# Patient Record
Sex: MALE | Race: WHITE | NOT HISPANIC OR LATINO | Employment: OTHER | URBAN - NONMETROPOLITAN AREA
[De-identification: names, ages, dates, MRNs, and addresses within clinical notes are randomized per-mention and may not be internally consistent; named-entity substitution may affect disease eponyms.]

---

## 2024-10-23 ENCOUNTER — OFFICE VISIT (OUTPATIENT)
Dept: FAMILY MEDICINE CLINIC | Facility: CLINIC | Age: 44
End: 2024-10-23
Payer: MEDICAID

## 2024-10-23 VITALS
HEIGHT: 75 IN | HEART RATE: 77 BPM | DIASTOLIC BLOOD PRESSURE: 90 MMHG | OXYGEN SATURATION: 96 % | WEIGHT: 279 LBS | TEMPERATURE: 97.7 F | SYSTOLIC BLOOD PRESSURE: 140 MMHG | BODY MASS INDEX: 34.69 KG/M2

## 2024-10-23 DIAGNOSIS — Z12.11 SCREENING FOR COLON CANCER: ICD-10-CM

## 2024-10-23 DIAGNOSIS — E29.1 HYPOGONADISM IN MALE: Primary | ICD-10-CM

## 2024-10-23 DIAGNOSIS — Z00.00 ROUTINE GENERAL MEDICAL EXAMINATION AT A HEALTH CARE FACILITY: Primary | ICD-10-CM

## 2024-10-23 DIAGNOSIS — Z11.59 NEED FOR HEPATITIS C SCREENING TEST: ICD-10-CM

## 2024-10-23 DIAGNOSIS — F19.11 HISTORY OF SUBSTANCE ABUSE: ICD-10-CM

## 2024-10-23 DIAGNOSIS — E11.43 TYPE 2 DIABETES MELLITUS WITH DIABETIC AUTONOMIC NEUROPATHY, WITHOUT LONG-TERM CURRENT USE OF INSULIN: ICD-10-CM

## 2024-10-23 DIAGNOSIS — E78.5 DYSLIPIDEMIA: ICD-10-CM

## 2024-10-23 DIAGNOSIS — E29.1 HYPOGONADISM IN MALE: ICD-10-CM

## 2024-10-23 DIAGNOSIS — Z80.0 FAMILY HISTORY OF COLON CANCER IN FATHER: ICD-10-CM

## 2024-10-23 DIAGNOSIS — Z12.5 SPECIAL SCREENING FOR MALIGNANT NEOPLASM OF PROSTATE: ICD-10-CM

## 2024-10-23 DIAGNOSIS — Z79.890 LONG-TERM CURRENT USE OF TESTOSTERONE REPLACEMENT THERAPY: ICD-10-CM

## 2024-10-23 DIAGNOSIS — R74.8 ELEVATED LIVER ENZYMES: ICD-10-CM

## 2024-10-23 DIAGNOSIS — I10 PRIMARY HYPERTENSION: ICD-10-CM

## 2024-10-23 PROCEDURE — 3051F HG A1C>EQUAL 7.0%<8.0%: CPT | Performed by: NURSE PRACTITIONER

## 2024-10-23 PROCEDURE — 2014F MENTAL STATUS ASSESS: CPT | Performed by: NURSE PRACTITIONER

## 2024-10-23 PROCEDURE — 99386 PREV VISIT NEW AGE 40-64: CPT | Performed by: NURSE PRACTITIONER

## 2024-10-23 RX ORDER — FENOFIBRATE 54 MG/1
54 TABLET ORAL DAILY
Qty: 90 TABLET | Refills: 1 | Status: SHIPPED | OUTPATIENT
Start: 2024-10-23

## 2024-10-23 RX ORDER — AMLODIPINE BESYLATE 10 MG/1
10 TABLET ORAL DAILY
COMMUNITY
Start: 2024-09-11 | End: 2024-10-23 | Stop reason: SDUPTHER

## 2024-10-23 RX ORDER — SEMAGLUTIDE 2.68 MG/ML
2 INJECTION, SOLUTION SUBCUTANEOUS WEEKLY
COMMUNITY
Start: 2024-09-11 | End: 2024-10-23 | Stop reason: SDUPTHER

## 2024-10-23 RX ORDER — TESTOSTERONE CYPIONATE 200 MG/ML
200 INJECTION, SOLUTION INTRAMUSCULAR
COMMUNITY
Start: 2024-09-03 | End: 2024-10-23 | Stop reason: SDUPTHER

## 2024-10-23 RX ORDER — FENOFIBRATE 54 MG/1
54 TABLET ORAL DAILY
COMMUNITY
Start: 2024-09-03 | End: 2024-10-23 | Stop reason: SDUPTHER

## 2024-10-23 RX ORDER — IBUPROFEN 800 MG/1
800 TABLET, FILM COATED ORAL EVERY 6 HOURS PRN
COMMUNITY

## 2024-10-23 RX ORDER — METFORMIN HYDROCHLORIDE 500 MG/1
500 TABLET, EXTENDED RELEASE ORAL
COMMUNITY
Start: 2024-09-11 | End: 2024-10-23

## 2024-10-23 RX ORDER — LOSARTAN POTASSIUM 25 MG/1
25 TABLET ORAL DAILY
Qty: 30 TABLET | Refills: 2 | Status: SHIPPED | OUTPATIENT
Start: 2024-10-23

## 2024-10-23 RX ORDER — AMLODIPINE BESYLATE 10 MG/1
10 TABLET ORAL DAILY
Qty: 90 TABLET | Refills: 1 | Status: SHIPPED | OUTPATIENT
Start: 2024-10-23

## 2024-10-23 NOTE — PROGRESS NOTES
New Patient History and Physical      Referring Physician: No ref. provider found    Chief Complaint:    Chief Complaint   Patient presents with    [facility]    Med Refill     Patient states he is a diabetic and is out of his ozempic     Annual Exam       History of Present Illness:   Roque Proctor is a 44 y.o. male who presents today as a new patient. Annual physical exam.    Recently moved back 3 months ago.     Type 2 DM  Ozempic 2mg weekly--has not taken in 2 weeks, needs refill  Restarted Metformin XR 500mg for the past 2 weeks  Glucose has been running in 200-250 range since he stopped ozempic  Prior to that was between 100-150 consistently    HTN  Amlodipine 10mg daily    HLD  Fenofibrate 54mg daily    Hypogonadism  Testosterone 200mg every 2 weeks  Has not taken in 2 weeks    Vapes daily, nicotine.     Denies family history of prostate cancer  Family history of colon cancer--father    Denies chest pain, SOA, palpitations, severe HA, confusion, visual disturbance, dizziness.   Denies SI/HI. Denies any aspiration/dysphagia. Balanced dietary intake, good hydration habits. Denies fever, chills, night sweats. Reports adequate UOP without hematuria. Denies BRB/BTS.    Subjective     Review of Systems   Constitutional:  Negative for unexpected weight change.   Eyes:  Negative for visual disturbance.   Respiratory:  Negative for chest tightness and shortness of breath.    Cardiovascular:  Negative for chest pain and palpitations.   Gastrointestinal:  Negative for abdominal pain, blood in stool, constipation and diarrhea.   Endocrine: Negative for polydipsia, polyphagia and polyuria.   Genitourinary:  Negative for penile discharge, penile pain, penile swelling, scrotal swelling and testicular pain.   Neurological:  Negative for dizziness and light-headedness.   Psychiatric/Behavioral:  Negative for self-injury and suicidal ideas.    All other systems reviewed and are negative.         The following  portions of the patient's history were reviewed and updated as appropriate: allergies, current medications, past family history, past medical history, past social history, past surgical history and problem list.    Past Medical History:   Past Medical History:   Diagnosis Date    Anxiety     Arthritis     Diabetes mellitus     Hypertension     Substance abuse     Visual impairment        Past Surgical History:History reviewed. No pertinent surgical history.    Family History: family history includes Colon cancer in his father; Diabetes in his father, maternal grandfather, maternal grandmother, paternal grandfather, and paternal grandmother; Lung cancer in his mother.    Social History:  reports that he quit smoking about 4 years ago. His smoking use included cigarettes. He started smoking about 31 years ago. He has a 27 pack-year smoking history. He has never been exposed to tobacco smoke. He has never used smokeless tobacco. He reports that he does not currently use alcohol. He reports that he does not currently use drugs after having used the following drugs: Heroin and Methamphetamines.    Medications:    Current Outpatient Medications:     amLODIPine (NORVASC) 10 MG tablet, Take 1 tablet by mouth Daily., Disp: 90 tablet, Rfl: 1    fenofibrate (TRICOR) 54 MG tablet, Take 1 tablet by mouth Daily., Disp: 90 tablet, Rfl: 1    ibuprofen (ADVIL,MOTRIN) 800 MG tablet, Take 1 tablet by mouth Every 6 (Six) Hours As Needed., Disp: , Rfl:     losartan (Cozaar) 25 MG tablet, Take 1 tablet by mouth Daily., Disp: 30 tablet, Rfl: 2    Ozempic, 2 MG/DOSE, 8 MG/3ML solution pen-injector, Inject 2 mg under the skin into the appropriate area as directed 1 (One) Time Per Week., Disp: 3 mL, Rfl: 2    Testosterone Cypionate (DEPOTESTOTERONE CYPIONATE) 200 MG/ML injection, Inject 1 mL into the appropriate muscle as directed by prescriber Every 14 (Fourteen) Days., Disp: 2 mL, Rfl: 0    Allergies:  No Known Allergies    Objective  "    Vital Signs:   /90   Pulse 77   Temp 97.7 °F (36.5 °C)   Ht 190.5 cm (75\") Comment: Patient states height  Wt 127 kg (279 lb)   SpO2 96%   BMI 34.87 kg/m²      BMI is >= 30 and <35. (Class 1 Obesity). The following options were offered after discussion;: information on healthy weight added to patient's after visit summary           Physical Exam:  Physical Exam  Vitals and nursing note reviewed.   HENT:      Right Ear: Tympanic membrane normal.      Left Ear: Tympanic membrane normal.      Nose: Nose normal.      Mouth/Throat:      Lips: Pink.   Eyes:      General: Lids are normal.      Extraocular Movements: Extraocular movements intact.      Conjunctiva/sclera: Conjunctivae normal.      Pupils: Pupils are equal, round, and reactive to light.   Neck:      Thyroid: No thyroid mass, thyromegaly or thyroid tenderness.      Vascular: Normal carotid pulses. No carotid bruit or JVD.   Cardiovascular:      Rate and Rhythm: Normal rate and regular rhythm.      Heart sounds: Normal heart sounds.   Pulmonary:      Effort: Pulmonary effort is normal.      Breath sounds: Normal breath sounds.   Abdominal:      General: Bowel sounds are normal.      Palpations: Abdomen is soft.   Genitourinary:     Comments: deferred  Skin:     Capillary Refill: Capillary refill takes less than 2 seconds.   Neurological:      Mental Status: He is alert and oriented to person, place, and time.      Gait: Gait is intact.   Psychiatric:         Attention and Perception: Attention normal.         Mood and Affect: Mood and affect normal.         Speech: Speech normal.         Behavior: Behavior normal. Behavior is cooperative.         Assessment / Plan     Assessment/Plan:   Diagnoses and all orders for this visit:    1. Routine general medical examination at a health care facility (Primary)  -     Hemoglobin A1c  -     CBC w AUTO Differential  -     Comprehensive metabolic panel  -     Lipid Panel    2. Primary hypertension  -     " losartan (Cozaar) 25 MG tablet; Take 1 tablet by mouth Daily.  Dispense: 30 tablet; Refill: 2  -     amLODIPine (NORVASC) 10 MG tablet; Take 1 tablet by mouth Daily.  Dispense: 90 tablet; Refill: 1  -     Comprehensive metabolic panel    3. Type 2 diabetes mellitus with diabetic autonomic neuropathy, without long-term current use of insulin  -     Hemoglobin A1c  -     Comprehensive metabolic panel    4. Dyslipidemia  -     fenofibrate (TRICOR) 54 MG tablet; Take 1 tablet by mouth Daily.  Dispense: 90 tablet; Refill: 1  -     Lipid Panel    5. Hypogonadism in male    6. Long-term current use of testosterone replacement therapy    7. Need for hepatitis C screening test  -     Hepatitis C Antibody    8. History of substance abuse    9. Family history of colon cancer in father    10. Screening for colon cancer  -     Ambulatory Referral For Screening Colonoscopy    11. Special screening for malignant neoplasm of prostate  -     PSA SCREENING    12. Elevated liver enzymes  -     Hepatitis C Virus (HCV) RNA, Diagnosis     Risks, benefits, and potential side effects of current/new medications reviewed with patient.  Patient voiced understanding and wished to proceed with treatment.    I will contact patient regarding test results and provide instructions regarding any necessary changes in plan of care.    Encouraged ambulatory glucose monitoring. Patient to call office or RTC for glucose levels consistently greater than 300 or poorly controlled with current medication regimen.     Discussed need for reduction in sodium/salt/caffeine intake; improve sleep habits as able; inc formal CV exercise program with goal of vigorous activity most, if not all, days of the week.     Ambulatory blood pressure monitoring encouraged prior to taking medication daily and as needed.    Contact office for symptomatic HTN or consistently uncontrolled HTN.     Patient to increase dietary intake of vegetables, fruits, nuts, whole grains and  fish. Decrease dietary intake of carbs, processed foods such as lunch meats, saturated fats, sugary beverages.     Increase exercise regimen as tolerated toward a goal of 120-150 minutes/week.     Patient to present to ED for chest pain, confusion, vision disturbance.     Patient was encouraged to keep me informed of any acute changes, lack of improvement, or any new concerning symptoms.      Discussion Summary:  Discussed plan of care in detail with pt today; pt verb understanding and agrees.        I have reviewed and updated all copied forward information, as appropriate.  I attest to the accuracy and relevance of any unchanged information.        Follow up:  No follow-ups on file.     Patient Education:  There are no Patient Instructions on file for this visit.    KAYLIE Muñoz  11/07/24  12:28 EST    Please note that portions of this note may have been completed with a voice recognition program.

## 2024-10-23 NOTE — TELEPHONE ENCOUNTER
Caller: Esthela Roque ZAHEER    Relationship: Self    Best call back number: [phone]     Requested Prescriptions:   Requested Prescriptions     Pending Prescriptions Disp Refills    Testosterone Cypionate (DEPOTESTOTERONE CYPIONATE) 200 MG/ML injection       Sig: Inject 1 mL into the appropriate muscle as directed by prescriber Every 14 (Fourteen) Days.    Ozempic, 2 MG/DOSE, 8 MG/3ML solution pen-injector       Sig: Inject 2 mg under the skin into the appropriate area as directed 1 (One) Time Per Week.        Pharmacy where request should be sent: [address] [phone] [facility] [phone] [facility]     Last office visit with prescribing clinician: 10/23/2024   Last telemedicine visit with prescribing clinician: Visit date not found   Next office visit with prescribing clinician: 1/23/2025     Additional details provided by patient: THE PATIENT ADVISED THAT HE IS OUT OF MEDICATION AND THOUGHTS THESE WERE BEING SENT IN AT HIS APPOINTMENT TODAY.     Does the patient have less than a 3 day supply:  [x] Yes  [] No    Would you like a call back once the refill request has been completed: [] Yes [x] No    If the office needs to give you a call back, can they leave a voicemail: [] Yes [x] No    Stephanie Rivero Rep   10/23/24 18:04 EDT

## 2024-10-24 LAB
ALBUMIN SERPL-MCNC: 4.4 G/DL (ref 4.1–5.1)
ALP SERPL-CCNC: 82 IU/L (ref 44–121)
ALT SERPL-CCNC: 165 IU/L (ref 0–44)
AST SERPL-CCNC: 69 IU/L (ref 0–40)
BASOPHILS # BLD AUTO: 0.1 X10E3/UL (ref 0–0.2)
BASOPHILS NFR BLD AUTO: 1 %
BILIRUB SERPL-MCNC: 0.5 MG/DL (ref 0–1.2)
BUN SERPL-MCNC: 11 MG/DL (ref 6–24)
BUN/CREAT SERPL: 12 (ref 9–20)
CALCIUM SERPL-MCNC: 9.1 MG/DL (ref 8.7–10.2)
CHLORIDE SERPL-SCNC: 100 MMOL/L (ref 96–106)
CHOLEST SERPL-MCNC: 152 MG/DL (ref 100–199)
CO2 SERPL-SCNC: 21 MMOL/L (ref 20–29)
CREAT SERPL-MCNC: 0.93 MG/DL (ref 0.76–1.27)
EGFRCR SERPLBLD CKD-EPI 2021: 103 ML/MIN/1.73
EOSINOPHIL # BLD AUTO: 0.2 X10E3/UL (ref 0–0.4)
EOSINOPHIL NFR BLD AUTO: 1 %
ERYTHROCYTE [DISTWIDTH] IN BLOOD BY AUTOMATED COUNT: 13.1 % (ref 11.6–15.4)
GLOBULIN SER CALC-MCNC: 2.7 G/DL (ref 1.5–4.5)
GLUCOSE SERPL-MCNC: 226 MG/DL (ref 70–99)
HBA1C MFR BLD: 7.5 % (ref 4.8–5.6)
HCT VFR BLD AUTO: 47.1 % (ref 37.5–51)
HCV IGG SERPL QL IA: REACTIVE
HDLC SERPL-MCNC: 28 MG/DL
HGB BLD-MCNC: 15.3 G/DL (ref 13–17.7)
IMM GRANULOCYTES # BLD AUTO: 0.1 X10E3/UL (ref 0–0.1)
IMM GRANULOCYTES NFR BLD AUTO: 1 %
LDLC SERPL CALC-MCNC: 90 MG/DL (ref 0–99)
LYMPHOCYTES # BLD AUTO: 6.7 X10E3/UL (ref 0.7–3.1)
LYMPHOCYTES NFR BLD AUTO: 44 %
MCH RBC QN AUTO: 27.8 PG (ref 26.6–33)
MCHC RBC AUTO-ENTMCNC: 32.5 G/DL (ref 31.5–35.7)
MCV RBC AUTO: 86 FL (ref 79–97)
MONOCYTES # BLD AUTO: 0.8 X10E3/UL (ref 0.1–0.9)
MONOCYTES NFR BLD AUTO: 5 %
NEUTROPHILS # BLD AUTO: 7.3 X10E3/UL (ref 1.4–7)
NEUTROPHILS NFR BLD AUTO: 48 %
PLATELET # BLD AUTO: 226 X10E3/UL (ref 150–450)
POTASSIUM SERPL-SCNC: 4 MMOL/L (ref 3.5–5.2)
PROT SERPL-MCNC: 7.1 G/DL (ref 6–8.5)
PSA SERPL-MCNC: 1.3 NG/ML (ref 0–4)
RBC # BLD AUTO: 5.51 X10E6/UL (ref 4.14–5.8)
SODIUM SERPL-SCNC: 138 MMOL/L (ref 134–144)
TRIGL SERPL-MCNC: 200 MG/DL (ref 0–149)
VLDLC SERPL CALC-MCNC: 34 MG/DL (ref 5–40)
WBC # BLD AUTO: 15.1 X10E3/UL (ref 3.4–10.8)

## 2024-10-24 NOTE — TELEPHONE ENCOUNTER
Rx Refill Note  Requested Prescriptions     Pending Prescriptions Disp Refills    Testosterone Cypionate (DEPOTESTOTERONE CYPIONATE) 200 MG/ML injection       Sig: Inject 1 mL into the appropriate muscle as directed by prescriber Every 14 (Fourteen) Days.    Ozempic, 2 MG/DOSE, 8 MG/3ML solution pen-injector       Sig: Inject 2 mg under the skin into the appropriate area as directed 1 (One) Time Per Week.      Last office visit with prescribing clinician: 10/23/2024   Last telemedicine visit with prescribing clinician: Visit date not found   Next office visit with prescribing clinician: 1/23/2025     Tatum Juarez MA  10/24/24, 11:20 EDT

## 2024-10-30 ENCOUNTER — TELEPHONE (OUTPATIENT)
Dept: FAMILY MEDICINE CLINIC | Facility: CLINIC | Age: 44
End: 2024-10-30

## 2024-10-30 DIAGNOSIS — E29.1 HYPOGONADISM IN MALE: ICD-10-CM

## 2024-10-30 RX ORDER — TESTOSTERONE CYPIONATE 200 MG/ML
200 INJECTION, SOLUTION INTRAMUSCULAR
Qty: 2 ML | Refills: 0 | Status: CANCELLED | OUTPATIENT
Start: 2024-10-30

## 2024-10-30 RX ORDER — SEMAGLUTIDE 2.68 MG/ML
2 INJECTION, SOLUTION SUBCUTANEOUS WEEKLY
Qty: 3 ML | Refills: 2 | Status: SHIPPED | OUTPATIENT
Start: 2024-10-30

## 2024-10-30 RX ORDER — SEMAGLUTIDE 2.68 MG/ML
2 INJECTION, SOLUTION SUBCUTANEOUS WEEKLY
Qty: 3 ML | Refills: 2 | Status: CANCELLED | OUTPATIENT
Start: 2024-10-30

## 2024-10-30 RX ORDER — TESTOSTERONE CYPIONATE 200 MG/ML
200 INJECTION, SOLUTION INTRAMUSCULAR
Qty: 2 ML | Refills: 0 | Status: SHIPPED | OUTPATIENT
Start: 2024-10-30

## 2024-10-30 NOTE — TELEPHONE ENCOUNTER
Pt called and said he spoke with Lizzy about getting his Testosterone and ozempic refilled  and needs it because he out

## 2024-10-31 ENCOUNTER — TELEPHONE (OUTPATIENT)
Dept: SURGERY | Facility: CLINIC | Age: 44
End: 2024-10-31
Payer: MEDICAID

## 2024-10-31 ENCOUNTER — TELEPHONE (OUTPATIENT)
Dept: FAMILY MEDICINE CLINIC | Facility: CLINIC | Age: 44
End: 2024-10-31
Payer: MEDICAID

## 2024-10-31 NOTE — TELEPHONE ENCOUNTER
Tried to reach the pt regarding a colonoscopy referral from KAYLIE Muñoz-VM box is full-Will try again to reach the pt to go over the colonoscopy screening questions. 1st attempt.     *IF PT RETURNS THIS CALL, PLEASE SEND THE CALL TO THE OFFICE. ASK FOR AVELINA OR ARTURO, IF AVELINA IS NOT AVAILABLE*

## 2024-11-01 LAB
HCV RNA SERPL NAA+PROBE-ACNC: NORMAL IU/ML
HCV RNA SERPL NAA+PROBE-LOG IU: 5.18 LOG10 IU/ML
TEST INFORMATION: NORMAL

## 2024-11-05 ENCOUNTER — TELEPHONE (OUTPATIENT)
Dept: SURGERY | Facility: CLINIC | Age: 44
End: 2024-11-05
Payer: MEDICAID

## 2024-11-06 ENCOUNTER — OFFICE VISIT (OUTPATIENT)
Dept: FAMILY MEDICINE CLINIC | Facility: CLINIC | Age: 44
End: 2024-11-06
Payer: MEDICAID

## 2024-11-06 VITALS
RESPIRATION RATE: 16 BRPM | HEIGHT: 75 IN | HEART RATE: 76 BPM | TEMPERATURE: 97.9 F | DIASTOLIC BLOOD PRESSURE: 90 MMHG | OXYGEN SATURATION: 98 % | WEIGHT: 277 LBS | SYSTOLIC BLOOD PRESSURE: 138 MMHG | BODY MASS INDEX: 34.44 KG/M2

## 2024-11-06 DIAGNOSIS — R53.83 DECREASED ENERGY: Primary | ICD-10-CM

## 2024-11-06 DIAGNOSIS — B17.10 ACUTE HEPATITIS C VIRUS INFECTION WITHOUT HEPATIC COMA: ICD-10-CM

## 2024-11-06 DIAGNOSIS — R53.83 OTHER FATIGUE: ICD-10-CM

## 2024-11-06 PROCEDURE — 1159F MED LIST DOCD IN RCRD: CPT | Performed by: NURSE PRACTITIONER

## 2024-11-06 PROCEDURE — 1160F RVW MEDS BY RX/DR IN RCRD: CPT | Performed by: NURSE PRACTITIONER

## 2024-11-06 PROCEDURE — 99213 OFFICE O/P EST LOW 20 MIN: CPT | Performed by: NURSE PRACTITIONER

## 2024-11-06 PROCEDURE — 3051F HG A1C>EQUAL 7.0%<8.0%: CPT | Performed by: NURSE PRACTITIONER

## 2024-11-06 NOTE — PROGRESS NOTES
"                      Established Patient        Chief Complaint:   Chief Complaint   Patient presents with    Fatigue     Pt is here for being tired all the time and just wants to sleep.          History of Present Illness:    Roque Proctor is a 44 y.o. male who presents today for concerns of fatigue, decreased energy. Patient reports onset about 2 months ago. States that he thinks it may be due to going without all medication for 2 weeks and then having to restart after last appointment.     Current Hepatitis C infection. Referral to GI for treatment options.     Subjective     The following portions of the patient's history were reviewed and updated as appropriate: allergies, current medications, past family history, past medical history, past social history, past surgical history and problem list.    ALLERGIES  No Known Allergies    ROS  Review of Systems   Constitutional:  Positive for activity change and fatigue. Negative for unexpected weight change.   Eyes:  Negative for visual disturbance.   Respiratory:  Negative for chest tightness and shortness of breath.    Cardiovascular:  Negative for chest pain and palpitations.   Endocrine: Negative for polydipsia, polyphagia and polyuria.   Neurological:  Negative for dizziness and light-headedness.   Psychiatric/Behavioral:  Negative for self-injury and suicidal ideas.    All other systems reviewed and are negative.        Objective     Vital Signs:   /90   Pulse 76   Temp 97.9 °F (36.6 °C) (Axillary)   Resp 16   Ht 190.5 cm (75\")   Wt 126 kg (277 lb)   SpO2 98%   BMI 34.62 kg/m²                Physical Exam   Physical Exam  Vitals and nursing note reviewed.   HENT:      Mouth/Throat:      Lips: Pink.      Mouth: Mucous membranes are moist.   Eyes:      General: Lids are normal.      Extraocular Movements: Extraocular movements intact.      Pupils: Pupils are equal, round, and reactive to light.   Cardiovascular:      Rate and Rhythm: Normal rate and " regular rhythm.      Heart sounds: Normal heart sounds.   Pulmonary:      Effort: Pulmonary effort is normal.      Breath sounds: Normal breath sounds.   Neurological:      Mental Status: He is alert and oriented to person, place, and time.      Gait: Gait is intact.   Psychiatric:         Attention and Perception: Attention normal.         Mood and Affect: Mood and affect normal.         Speech: Speech normal.         Behavior: Behavior normal. Behavior is cooperative.         Assessment and Plan      Assessment/Plan:   Diagnoses and all orders for this visit:    1. Decreased energy (Primary)    2. Other fatigue    3. Acute hepatitis C virus infection without hepatic coma  -     Ambulatory Referral to Gastroenterology    Follow up with Gastroenterology.    Discussion Summary:  Discussed plan of care in detail with pt today; pt verb understanding and agrees.            I have reviewed and updated all copied forward information, as appropriate.  I attest to the accuracy and relevance of any unchanged information.      Follow up:  No follow-ups on file.     Patient Education:  There are no Patient Instructions on file for this visit.    KAYLIE Muñoz  11/20/24  18:15 EST          Please note that portions of this note may have been completed with a voice recognition program.

## 2024-11-08 DIAGNOSIS — E29.1 HYPOGONADISM IN MALE: ICD-10-CM

## 2024-11-08 RX ORDER — TESTOSTERONE CYPIONATE 200 MG/ML
200 INJECTION, SOLUTION INTRAMUSCULAR
Qty: 2 ML | Refills: 0 | Status: SHIPPED | OUTPATIENT
Start: 2024-11-08

## 2024-11-08 RX ORDER — SEMAGLUTIDE 2.68 MG/ML
2 INJECTION, SOLUTION SUBCUTANEOUS WEEKLY
Qty: 3 ML | Refills: 2 | Status: SHIPPED | OUTPATIENT
Start: 2024-11-08

## 2024-11-15 ENCOUNTER — TELEPHONE (OUTPATIENT)
Dept: GASTROENTEROLOGY | Facility: CLINIC | Age: 44
End: 2024-11-15

## 2024-11-15 ENCOUNTER — LAB (OUTPATIENT)
Dept: LAB | Facility: HOSPITAL | Age: 44
End: 2024-11-15
Payer: MEDICAID

## 2024-11-15 ENCOUNTER — SPECIALTY PHARMACY (OUTPATIENT)
Dept: PHARMACY | Facility: HOSPITAL | Age: 44
End: 2024-11-15
Payer: MEDICAID

## 2024-11-15 VITALS
DIASTOLIC BLOOD PRESSURE: 80 MMHG | HEART RATE: 82 BPM | OXYGEN SATURATION: 96 % | WEIGHT: 279.8 LBS | SYSTOLIC BLOOD PRESSURE: 140 MMHG | TEMPERATURE: 98.6 F | BODY MASS INDEX: 34.97 KG/M2

## 2024-11-15 DIAGNOSIS — Z12.12 ENCOUNTER FOR COLORECTAL CANCER SCREENING: Primary | ICD-10-CM

## 2024-11-15 DIAGNOSIS — B18.2 CHRONIC HEPATITIS C WITHOUT HEPATIC COMA: ICD-10-CM

## 2024-11-15 DIAGNOSIS — Z12.12 ENCOUNTER FOR COLORECTAL CANCER SCREENING: ICD-10-CM

## 2024-11-15 DIAGNOSIS — B18.2 CHRONIC HEPATITIS C WITHOUT HEPATIC COMA: Primary | ICD-10-CM

## 2024-11-15 DIAGNOSIS — R74.8 ELEVATED LIVER ENZYMES: ICD-10-CM

## 2024-11-15 DIAGNOSIS — Z12.11 ENCOUNTER FOR COLORECTAL CANCER SCREENING: Primary | ICD-10-CM

## 2024-11-15 DIAGNOSIS — Z12.11 ENCOUNTER FOR COLORECTAL CANCER SCREENING: ICD-10-CM

## 2024-11-15 LAB
HBV SURFACE AB SER RIA-ACNC: REACTIVE
HBV SURFACE AG SERPL QL IA: NORMAL
HIV 1+2 AB+HIV1 P24 AG SERPL QL IA: NORMAL
INR PPP: 1.09 (ref 0.9–1.1)
PROTHROMBIN TIME: 14.7 SECONDS (ref 12.3–15.1)

## 2024-11-15 PROCEDURE — 86704 HEP B CORE ANTIBODY TOTAL: CPT

## 2024-11-15 PROCEDURE — 36415 COLL VENOUS BLD VENIPUNCTURE: CPT

## 2024-11-15 PROCEDURE — 86706 HEP B SURFACE ANTIBODY: CPT

## 2024-11-15 PROCEDURE — 86708 HEPATITIS A ANTIBODY: CPT

## 2024-11-15 PROCEDURE — 85610 PROTHROMBIN TIME: CPT

## 2024-11-15 PROCEDURE — G0432 EIA HIV-1/HIV-2 SCREEN: HCPCS

## 2024-11-15 PROCEDURE — 87340 HEPATITIS B SURFACE AG IA: CPT

## 2024-11-15 RX ORDER — SODIUM CHLORIDE 9 MG/ML
30 INJECTION, SOLUTION INTRAVENOUS CONTINUOUS PRN
OUTPATIENT
Start: 2024-11-15 | End: 2024-11-16

## 2024-11-15 RX ORDER — LANOLIN ALCOHOL/MO/W.PET/CERES
1000 CREAM (GRAM) TOPICAL DAILY
COMMUNITY

## 2024-11-15 RX ORDER — BISACODYL 5 MG/1
TABLET, DELAYED RELEASE ORAL
Qty: 4 TABLET | Refills: 0 | Status: SHIPPED | OUTPATIENT
Start: 2024-11-15

## 2024-11-15 NOTE — TELEPHONE ENCOUNTER
This patient was seen in the [facility] clinic today.  He needs screening colonoscopy arranged.  Case request placed and GoLytely/Dulcolax prep sent.

## 2024-11-15 NOTE — LETTER
November 15, 2024     KAYLIE Muñoz  [address]  [facility] [address]    Patient: Roque Proctor   YOB: 1980   Date of Visit: 11/15/2024       Dear KAYLIE Muñoz,    Thank you for referring Roque Proctor to me for evaluation. Below is a copy of my consult note.    If you have questions, please do not hesitate to call me. I look forward to following Roque along with you.         Sincerely,        [facility]        CC: No Recipients         New Patient Consult      Date: 11/15/2024   Patient Name: Roque Proctor  MRN: [id number]  : 1980     Primary Care Provider: Lizzy Reyes APRN  Referring Provider: Eric    Chief Complaint   Patient presents with   • Hepatitis C     Pt here for initial Hep C eval     History of Present Illness: Roque Proctor is a 44 y.o. male who is here today as a consultation with Gastroenterology for evaluation of Hepatitis C.    He found out about having Hepatitis C infection approx 2 weeks ago. He has not had prior treatment for hepatitis. Reports no known personal history of liver disease including other viral hepatitis. There is no known family history of liver disease or cirrhosis. He admits to previous IVDU and intranasal drug use. Has been clean now for 3 years. He does have nonprofessional tattoos. Denies having previous alcoholism. He does not currently drink alcohol. He denies  current illicit drug use including marijuana. No recent liver imaging. He has had recent labs. He has not had previous vaccinations for Hepatitis A and B. Has been incarcerated in the past. He is currently working full time.      He has not had previous colonoscopy. Father had colon cancer, late 50s. No constipation or diarrhea. No rectal bleeding. No abdominal pain.     On ozempic. No current nausea or vomiting. No significant reflux symptoms.     Subjective      Past Medical History:   Diagnosis Date   • Anxiety    • Arthritis    • Diabetes mellitus     • Hypertension    • Substance abuse    • Visual impairment      History reviewed. No pertinent surgical history.  Family History   Problem Relation Age of Onset   • Lung cancer Mother    • Diabetes Father    • Colon cancer Father    • Diabetes Maternal Grandmother    • Diabetes Maternal Grandfather    • Diabetes Paternal Grandmother    • Diabetes Paternal Grandfather      Social History     Socioeconomic History   • Marital status: Single   Tobacco Use   • Smoking status: Former     Current packs/day: 0.00     Average packs/day: 1 pack/day for 27.0 years (27.0 ttl pk-yrs)     Types: Cigarettes     Start date:      Quit date:      Years since quittin.8     Passive exposure: Never   • Smokeless tobacco: Never   Vaping Use   • Vaping status: Every Day   • Substances: Nicotine, Flavoring   • Devices: Disposable   • Passive vaping exposure: Yes   Substance and Sexual Activity   • Alcohol use: Not Currently   • Drug use: Not Currently     Types: Heroin, Methamphetamines     Comment: Quit using both in    • Sexual activity: Yes     Partners: Female     Birth control/protection: None     Current Outpatient Medications:   •  amLODIPine (NORVASC) 10 MG tablet, Take 1 tablet by mouth Daily., Disp: 90 tablet, Rfl: 1  •  fenofibrate (TRICOR) 54 MG tablet, Take 1 tablet by mouth Daily., Disp: 90 tablet, Rfl: 1  •  losartan (Cozaar) 25 MG tablet, Take 1 tablet by mouth Daily., Disp: 30 tablet, Rfl: 2  •  Ozempic, 2 MG/DOSE, 8 MG/3ML solution pen-injector, Inject 2 mg under the skin into the appropriate area as directed 1 (One) Time Per Week., Disp: 3 mL, Rfl: 2  •  Testosterone Cypionate (DEPOTESTOTERONE CYPIONATE) 200 MG/ML injection, Inject 1 mL into the appropriate muscle as directed by prescriber Every 14 (Fourteen) Days., Disp: 2 mL, Rfl: 0  •  vitamin B-12 (CYANOCOBALAMIN) 1000 MCG tablet, Take 1 tablet by mouth Daily., Disp: , Rfl:   •  vitamin D3 125 MCG (5000 UT) capsule capsule, Take 1 capsule by mouth  Daily., Disp: , Rfl:     No Known Allergies    The following portions of the patient's history were reviewed and updated as appropriate: allergies, current medications, past family history, past medical history, past social history, past surgical history and problem list.    Objective     Physical Exam  Constitutional:       General: He is not in acute distress.     Appearance: Normal appearance. He is well-developed. He is obese. He is not diaphoretic.   HENT:      Head: Normocephalic and atraumatic.      Right Ear: External ear normal.      Left Ear: External ear normal.      Nose: Nose normal.   Eyes:      General: No scleral icterus.        Right eye: No discharge.         Left eye: No discharge.      Conjunctiva/sclera: Conjunctivae normal.   Neck:      Trachea: No tracheal deviation.   Pulmonary:      Effort: Pulmonary effort is normal. No respiratory distress.   Abdominal:      General: Bowel sounds are normal. There is no distension.      Palpations: Abdomen is soft.      Tenderness: There is no abdominal tenderness. There is no guarding.      Comments: Diastasis rectus noted   Musculoskeletal:         General: Normal range of motion.      Cervical back: Normal range of motion.   Skin:     Coloration: Skin is not pale.      Findings: No erythema or rash.      Comments: tattoos   Neurological:      Mental Status: He is alert and oriented to person, place, and time.      Coordination: Coordination normal.   Psychiatric:         Mood and Affect: Mood normal.         Behavior: Behavior normal.         Thought Content: Thought content normal.         Judgment: Judgment normal.         Vitals:    11/15/24 1212   BP: 140/80   BP Location: Right arm   Patient Position: Sitting   Pulse: 82   Temp: 98.6 °F (37 °C)   SpO2: 96%   Weight: 127 kg (279 lb 12.8 oz)     Results Review:   I have reviewed the patient's new clinical and imaging results.    Office Visit on 10/23/2024   Component Date Value Ref Range Status   •  Hep C Virus Ab 10/23/2024 Reactive (A)  Non Reactive Final    Comment: HCV antibody alone does not differentiate between previously  resolved infection and active infection. Equivocal and Reactive  HCV antibody results should be followed up with an HCV RNA test  to support the diagnosis of active HCV infection.     • PSA 10/23/2024 1.3  0.0 - 4.0 ng/mL Final    Comment: Roche ECLIA methodology.  According to the American U[employer] Association, Serum PSA should  decrease and remain at undetectable levels after radical  prostatectomy. The AUA defines biochemical recurrence as an initial  PSA value 0.2 ng/mL or greater followed by a subsequent confirmatory  PSA value 0.2 ng/mL or greater.  Values obtained with different assay methods or kits cannot be used  interchangeably. Results cannot be interpreted as absolute evidence  of the presence or absence of malignant disease.     • Hemoglobin A1C 10/23/2024 7.5 (H)  4.8 - 5.6 % Final    Comment:          Prediabetes: 5.7 - 6.4           Diabetes: >6.4           Glycemic control for adults with diabetes: <7.0     • WBC 10/23/2024 15.1 (H)  3.4 - 10.8 x10E3/uL Final   • RBC 10/23/2024 5.51  4.14 - 5.80 x10E6/uL Final   • Hemoglobin 10/23/2024 15.3  13.0 - 17.7 g/dL Final   • Hematocrit 10/23/2024 47.1  37.5 - 51.0 % Final   • MCV 10/23/2024 86  79 - 97 fL Final   • MCH 10/23/2024 27.8  26.6 - 33.0 pg Final   • MCHC 10/23/2024 32.5  31.5 - 35.7 g/dL Final   • RDW 10/23/2024 13.1  11.6 - 15.4 % Final   • Platelets 10/23/2024 226  150 - 450 x10E3/uL Final   • Neutrophil Rel % 10/23/2024 48  Not Estab. % Final   • Lymphocyte Rel % 10/23/2024 44  Not Estab. % Final   • Monocyte Rel % 10/23/2024 5  Not Estab. % Final   • Eosinophil Rel % 10/23/2024 1  Not Estab. % Final   • Basophil Rel % 10/23/2024 1  Not Estab. % Final   • Neutrophils Absolute 10/23/2024 7.3 (H)  1.4 - 7.0 x10E3/uL Final   • Lymphocytes Absolute 10/23/2024 6.7 (H)  0.7 - 3.1 x10E3/uL Final   • Monocytes  Absolute 10/23/2024 0.8  0.1 - 0.9 x10E3/uL Final   • Eosinophils Absolute 10/23/2024 0.2  0.0 - 0.4 x10E3/uL Final   • Basophils Absolute 10/23/2024 0.1  0.0 - 0.2 x10E3/uL Final   • Immature Granulocyte Rel % 10/23/2024 1  Not Estab. % Final   • Immature Grans Absolute 10/23/2024 0.1  0.0 - 0.1 x10E3/uL Final   • Glucose 10/23/2024 226 (H)  70 - 99 mg/dL Final   • BUN 10/23/2024 11  6 - 24 mg/dL Final   • Creatinine 10/23/2024 0.93  0.76 - 1.27 mg/dL Final   • EGFR Result 10/23/2024 103  >59 mL/min/1.73 Final   • BUN/Creatinine Ratio 10/23/2024 12  9 - 20 Final   • Sodium 10/23/2024 138  134 - 144 mmol/L Final   • Potassium 10/23/2024 4.0  3.5 - 5.2 mmol/L Final   • Chloride 10/23/2024 100  96 - 106 mmol/L Final   • Total CO2 10/23/2024 21  20 - 29 mmol/L Final   • Calcium 10/23/2024 9.1  8.7 - 10.2 mg/dL Final   • Total Protein 10/23/2024 7.1  6.0 - 8.5 g/dL Final   • Albumin 10/23/2024 4.4  4.1 - 5.1 g/dL Final   • Globulin 10/23/2024 2.7  1.5 - 4.5 g/dL Final   • Total Bilirubin 10/23/2024 0.5  0.0 - 1.2 mg/dL Final   • Alkaline Phosphatase 10/23/2024 82  44 - 121 IU/L Final   • AST (SGOT) 10/23/2024 69 (H)  0 - 40 IU/L Final   • ALT (SGPT) 10/23/2024 165 (H)  0 - 44 IU/L Final   • Total Cholesterol 10/23/2024 152  100 - 199 mg/dL Final   • Triglycerides 10/23/2024 200 (H)  0 - 149 mg/dL Final   • HDL Cholesterol 10/23/2024 28 (L)  >39 mg/dL Final   • VLDL Cholesterol [facility] 10/23/2024 34  5 - 40 mg/dL Final   • LDL Chol Calc ([facility]) 10/23/2024 90  0 - 99 mg/dL Final   • HCV, RNA, QUANT 10/31/2024 153,000  IU/mL Final    Comment:                 HCV RNA detected  HCV RNA viral loads >/= 25 IU/mL indicate current HCV infection.     • HCV RNA, log10 10/31/2024 5.185  log10 IU/mL Final   • Test Information 10/31/2024 Comment   Final    The quantitative range of this assay is 15 IU/mL to 100 million IU/mL.      No recent abdominal imaging.      Assessment / Plan      1. Chronic hepatitis C without hepatic coma  2.  Elevated liver enzymes  He has chronic hepatitis C infection, genotype unknown, treatment naïve, without suspected cirrhosis.  He will have labs today for further evaluation of chronic hepatitis C infection as well as for consideration for treatment.    More recommendations will be made after these results have been reviewed. He will continue to abstain from alcohol and illegal drugs. Immunity to Hep A and B will be determined and vaccinations recommended if needed. After review of these results and discussion with with the patient, we will proceed with Hep C therapy and will submit Rx to insurance company for approval. Treatment will depend on fibrosis score and Hep C genotype. When approved, he will  Rx from our pharmacy and start taking exactly as directed. Hep C viral load lab (HCV RNA quant) and CMP will be checked 4 weeks after start of therapy, at end of therapy and 3 months s/p therapy to determine response to treatment and cure. He will call with concerns.     - HCV FibroSURE; Future  - Hepatitis A Antibody, Total; Future  - Hepatitis B Core Antibody, Total; Future  - Hepatitis B Surface Antibody; Future  - Hepatitis B Surface Antigen; Future  - HIV-1 / O / 2 Ag / Antibody; Future  - Protime-INR; Future    3. Encounter for colorectal cancer screening  He has not had previous colonoscopy. Father had colon cancer, late 50s.  No current GI complaints.  Will arrange screening colonoscopy at his request with the GI clinic.     He will have a colonoscopy performed with monitored anesthesia care. The indications, technique, alternatives and potential risk and complications were discussed with the patient including but not limited to bleeding, bowel perforations, missing lesions and anesthetic complications. The patient understands and wishes to proceed with the procedure and has given their verbal consent. Written patient education information was given to the patient. He should follow up in the office  after this procedure to discuss the results and further recommendations can be made at that time. The patient will call if they have further questions before procedure.  - Case Request  - Golytely and dulcolax prep sent      Follow Up:   Return in about 6 weeks (around 12/27/2024) for recheck Hepatitis C (4 weeks after starting Hep C treatment).    America Villegas PA-C  [facility] [place]  11/15/2024  15:07 EST    Dictated Utilizing Dragon Dictation: Part of this note may be an electronic transcription/translation of spoken language to printed text using the Dragon Dictation System.

## 2024-11-15 NOTE — PROGRESS NOTES
New Patient Consult      Date: 11/15/2024   Patient Name: Roque Proctor  MRN: [id number]  : 1980     Primary Care Provider: Lizzy Reyes APRN  Referring Provider: Eric    Chief Complaint   Patient presents with    Hepatitis C     Pt here for initial Hep C eval     History of Present Illness: Roque Proctor is a 44 y.o. male who is here today as a consultation with Gastroenterology for evaluation of Hepatitis C.    He found out about having Hepatitis C infection approx 2 weeks ago. He has not had prior treatment for hepatitis. Reports no known personal history of liver disease including other viral hepatitis. There is no known family history of liver disease or cirrhosis. He admits to previous IVDU and intranasal drug use. Has been clean now for 3 years. He does have nonprofessional tattoos. Denies having previous alcoholism. He does not currently drink alcohol. He denies  current illicit drug use including marijuana. No recent liver imaging. He has had recent labs. He has not had previous vaccinations for Hepatitis A and B. Has been incarcerated in the past. He is currently working full time.      He has not had previous colonoscopy. Father had colon cancer, late 50s. No constipation or diarrhea. No rectal bleeding. No abdominal pain.     On ozempic. No current nausea or vomiting. No significant reflux symptoms.     Subjective      Past Medical History:   Diagnosis Date    Anxiety     Arthritis     Diabetes mellitus     Hypertension     Substance abuse     Visual impairment      History reviewed. No pertinent surgical history.  Family History   Problem Relation Age of Onset    Lung cancer Mother     Diabetes Father     Colon cancer Father     Diabetes Maternal Grandmother     Diabetes Maternal Grandfather     Diabetes Paternal Grandmother     Diabetes Paternal Grandfather      Social History     Socioeconomic History    Marital status: Single   Tobacco Use    Smoking status: Former     Current  packs/day: 0.00     Average packs/day: 1 pack/day for 27.0 years (27.0 ttl pk-yrs)     Types: Cigarettes     Start date:      Quit date:      Years since quittin.8     Passive exposure: Never    Smokeless tobacco: Never   Vaping Use    Vaping status: Every Day    Substances: Nicotine, Flavoring    Devices: Disposable    Passive vaping exposure: Yes   Substance and Sexual Activity    Alcohol use: Not Currently    Drug use: Not Currently     Types: Heroin, Methamphetamines     Comment: Quit using both in     Sexual activity: Yes     Partners: Female     Birth control/protection: None     Current Outpatient Medications:     amLODIPine (NORVASC) 10 MG tablet, Take 1 tablet by mouth Daily., Disp: 90 tablet, Rfl: 1    fenofibrate (TRICOR) 54 MG tablet, Take 1 tablet by mouth Daily., Disp: 90 tablet, Rfl: 1    losartan (Cozaar) 25 MG tablet, Take 1 tablet by mouth Daily., Disp: 30 tablet, Rfl: 2    Ozempic, 2 MG/DOSE, 8 MG/3ML solution pen-injector, Inject 2 mg under the skin into the appropriate area as directed 1 (One) Time Per Week., Disp: 3 mL, Rfl: 2    Testosterone Cypionate (DEPOTESTOTERONE CYPIONATE) 200 MG/ML injection, Inject 1 mL into the appropriate muscle as directed by prescriber Every 14 (Fourteen) Days., Disp: 2 mL, Rfl: 0    vitamin B-12 (CYANOCOBALAMIN) 1000 MCG tablet, Take 1 tablet by mouth Daily., Disp: , Rfl:     vitamin D3 125 MCG (5000 UT) capsule capsule, Take 1 capsule by mouth Daily., Disp: , Rfl:     No Known Allergies    The following portions of the patient's history were reviewed and updated as appropriate: allergies, current medications, past family history, past medical history, past social history, past surgical history and problem list.    Objective     Physical Exam  Constitutional:       General: He is not in acute distress.     Appearance: Normal appearance. He is well-developed. He is obese. He is not diaphoretic.   HENT:      Head: Normocephalic and atraumatic.       Right Ear: External ear normal.      Left Ear: External ear normal.      Nose: Nose normal.   Eyes:      General: No scleral icterus.        Right eye: No discharge.         Left eye: No discharge.      Conjunctiva/sclera: Conjunctivae normal.   Neck:      Trachea: No tracheal deviation.   Pulmonary:      Effort: Pulmonary effort is normal. No respiratory distress.   Abdominal:      General: Bowel sounds are normal. There is no distension.      Palpations: Abdomen is soft.      Tenderness: There is no abdominal tenderness. There is no guarding.      Comments: Diastasis rectus noted   Musculoskeletal:         General: Normal range of motion.      Cervical back: Normal range of motion.   Skin:     Coloration: Skin is not pale.      Findings: No erythema or rash.      Comments: tattoos   Neurological:      Mental Status: He is alert and oriented to person, place, and time.      Coordination: Coordination normal.   Psychiatric:         Mood and Affect: Mood normal.         Behavior: Behavior normal.         Thought Content: Thought content normal.         Judgment: Judgment normal.         Vitals:    11/15/24 1212   BP: 140/80   BP Location: Right arm   Patient Position: Sitting   Pulse: 82   Temp: 98.6 °F (37 °C)   SpO2: 96%   Weight: 127 kg (279 lb 12.8 oz)     Results Review:   I have reviewed the patient's new clinical and imaging results.    Office Visit on 10/23/2024   Component Date Value Ref Range Status    Hep C Virus Ab 10/23/2024 Reactive (A)  Non Reactive Final    Comment: HCV antibody alone does not differentiate between previously  resolved infection and active infection. Equivocal and Reactive  HCV antibody results should be followed up with an HCV RNA test  to support the diagnosis of active HCV infection.      PSA 10/23/2024 1.3  0.0 - 4.0 ng/mL Final    Comment: Roche ECLIA methodology.  According to the American Urological Association, Serum PSA should  decrease and remain at undetectable levels after  radical  prostatectomy. The AUA defines biochemical recurrence as an initial  PSA value 0.2 ng/mL or greater followed by a subsequent confirmatory  PSA value 0.2 ng/mL or greater.  Values obtained with different assay methods or kits cannot be used  interchangeably. Results cannot be interpreted as absolute evidence  of the presence or absence of malignant disease.      Hemoglobin A1C 10/23/2024 7.5 (H)  4.8 - 5.6 % Final    Comment:          Prediabetes: 5.7 - 6.4           Diabetes: >6.4           Glycemic control for adults with diabetes: <7.0      WBC 10/23/2024 15.1 (H)  3.4 - 10.8 x10E3/uL Final    RBC 10/23/2024 5.51  4.14 - 5.80 x10E6/uL Final    Hemoglobin 10/23/2024 15.3  13.0 - 17.7 g/dL Final    Hematocrit 10/23/2024 47.1  37.5 - 51.0 % Final    MCV 10/23/2024 86  79 - 97 fL Final    MCH 10/23/2024 27.8  26.6 - 33.0 pg Final    MCHC 10/23/2024 32.5  31.5 - 35.7 g/dL Final    RDW 10/23/2024 13.1  11.6 - 15.4 % Final    Platelets 10/23/2024 226  150 - 450 x10E3/uL Final    Neutrophil Rel % 10/23/2024 48  Not Estab. % Final    Lymphocyte Rel % 10/23/2024 44  Not Estab. % Final    Monocyte Rel % 10/23/2024 5  Not Estab. % Final    Eosinophil Rel % 10/23/2024 1  Not Estab. % Final    Basophil Rel % 10/23/2024 1  Not Estab. % Final    Neutrophils Absolute 10/23/2024 7.3 (H)  1.4 - 7.0 x10E3/uL Final    Lymphocytes Absolute 10/23/2024 6.7 (H)  0.7 - 3.1 x10E3/uL Final    Monocytes Absolute 10/23/2024 0.8  0.1 - 0.9 x10E3/uL Final    Eosinophils Absolute 10/23/2024 0.2  0.0 - 0.4 x10E3/uL Final    Basophils Absolute 10/23/2024 0.1  0.0 - 0.2 x10E3/uL Final    Immature Granulocyte Rel % 10/23/2024 1  Not Estab. % Final    Immature Grans Absolute 10/23/2024 0.1  0.0 - 0.1 x10E3/uL Final    Glucose 10/23/2024 226 (H)  70 - 99 mg/dL Final    BUN 10/23/2024 11  6 - 24 mg/dL Final    Creatinine 10/23/2024 0.93  0.76 - 1.27 mg/dL Final    EGFR Result 10/23/2024 103  >59 mL/min/1.73 Final    BUN/Creatinine Ratio  10/23/2024 12  9 - 20 Final    Sodium 10/23/2024 138  134 - 144 mmol/L Final    Potassium 10/23/2024 4.0  3.5 - 5.2 mmol/L Final    Chloride 10/23/2024 100  96 - 106 mmol/L Final    Total CO2 10/23/2024 21  20 - 29 mmol/L Final    Calcium 10/23/2024 9.1  8.7 - 10.2 mg/dL Final    Total Protein 10/23/2024 7.1  6.0 - 8.5 g/dL Final    Albumin 10/23/2024 4.4  4.1 - 5.1 g/dL Final    Globulin 10/23/2024 2.7  1.5 - 4.5 g/dL Final    Total Bilirubin 10/23/2024 0.5  0.0 - 1.2 mg/dL Final    Alkaline Phosphatase 10/23/2024 82  44 - 121 IU/L Final    AST (SGOT) 10/23/2024 69 (H)  0 - 40 IU/L Final    ALT (SGPT) 10/23/2024 165 (H)  0 - 44 IU/L Final    Total Cholesterol 10/23/2024 152  100 - 199 mg/dL Final    Triglycerides 10/23/2024 200 (H)  0 - 149 mg/dL Final    HDL Cholesterol 10/23/2024 28 (L)  >39 mg/dL Final    VLDL Cholesterol Harris 10/23/2024 34  5 - 40 mg/dL Final    LDL Chol Calc (NIH) 10/23/2024 90  0 - 99 mg/dL Final    HCV, RNA, QUANT 10/31/2024 153,000  IU/mL Final    Comment:                 HCV RNA detected  HCV RNA viral loads >/= 25 IU/mL indicate current HCV infection.      HCV RNA, log10 10/31/2024 5.185  log10 IU/mL Final    Test Information 10/31/2024 Comment   Final    The quantitative range of this assay is 15 IU/mL to 100 million IU/mL.      No recent abdominal imaging.      Assessment / Plan      1. Chronic hepatitis C without hepatic coma  2. Elevated liver enzymes  He has chronic hepatitis C infection, genotype unknown, treatment naïve, without suspected cirrhosis.  He will have labs today for further evaluation of chronic hepatitis C infection as well as for consideration for treatment.    More recommendations will be made after these results have been reviewed. He will continue to abstain from alcohol and illegal drugs. Immunity to Hep A and B will be determined and vaccinations recommended if needed. After review of these results and discussion with with the patient, we will proceed with Hep C  therapy and will submit Rx to insurance company for approval. Treatment will depend on fibrosis score and Hep C genotype. When approved, he will  Rx from our pharmacy and start taking exactly as directed. Hep C viral load lab (HCV RNA quant) and CMP will be checked 4 weeks after start of therapy, at end of therapy and 3 months s/p therapy to determine response to treatment and cure. He will call with concerns.     - HCV FibroSURE; Future  - Hepatitis A Antibody, Total; Future  - Hepatitis B Core Antibody, Total; Future  - Hepatitis B Surface Antibody; Future  - Hepatitis B Surface Antigen; Future  - HIV-1 / O / 2 Ag / Antibody; Future  - Protime-INR; Future    3. Encounter for colorectal cancer screening  He has not had previous colonoscopy. Father had colon cancer, late 50s.  No current GI complaints.  Will arrange screening colonoscopy at his request with the GI clinic.     He will have a colonoscopy performed with monitored anesthesia care. The indications, technique, alternatives and potential risk and complications were discussed with the patient including but not limited to bleeding, bowel perforations, missing lesions and anesthetic complications. The patient understands and wishes to proceed with the procedure and has given their verbal consent. Written patient education information was given to the patient. He should follow up in the office after this procedure to discuss the results and further recommendations can be made at that time. The patient will call if they have further questions before procedure.  - Case Request  - Golytely and dulcolax prep sent      Follow Up:   Return in about 6 weeks (around 12/27/2024) for recheck Hepatitis C (4 weeks after starting Hep C treatment).    America Villegas PA-C  [facility]  11/15/2024  15:07 EST    Dictated Utilizing Dragon Dictation: Part of this note may be an electronic transcription/translation of spoken language to printed text using the  Dragon Dictation System.

## 2024-11-17 LAB
HAV AB SER QL IA: NEGATIVE
HBV CORE AB SERPL QL IA: POSITIVE

## 2024-11-18 ENCOUNTER — TELEPHONE (OUTPATIENT)
Dept: SURGERY | Facility: CLINIC | Age: 44
End: 2024-11-18

## 2024-11-18 NOTE — TELEPHONE ENCOUNTER
CALLED PT REGARDING NO SHOW ON 11/18/24.WIFE ANSWERED AND EXPLAINED THAT THE PT IS SCHEDULED FOR A COLONOSCOPY WITH DR CRUZ IN JANUARY AND WANTS HIS CARE TRANSFERRED TO [facility] BECAUSE HE HAS FAMILY HISTORY OF COLON CANCER

## 2024-11-19 ENCOUNTER — TELEPHONE (OUTPATIENT)
Dept: GASTROENTEROLOGY | Facility: CLINIC | Age: 44
End: 2024-11-19
Payer: MEDICAID

## 2024-11-19 DIAGNOSIS — B18.2 CHRONIC HEPATITIS C WITHOUT HEPATIC COMA: Primary | ICD-10-CM

## 2024-11-19 LAB
A2 MACROGLOB SERPL-MCNC: 142 MG/DL (ref 110–276)
ALT SERPL W P-5'-P-CCNC: 168 IU/L (ref 0–55)
APO A-I SERPL-MCNC: 123 MG/DL (ref 101–178)
BILIRUB SERPL-MCNC: 0.7 MG/DL (ref 0–1.2)
FIBROSIS SCORING:: ABNORMAL
FIBROSIS STAGE SERPL QL: ABNORMAL
GGT SERPL-CCNC: 37 IU/L (ref 0–65)
HAPTOGLOB SERPL-MCNC: 92 MG/DL (ref 23–355)
HCV AB SER QL: ABNORMAL
LABORATORY COMMENT REPORT: ABNORMAL
LIVER FIBR SCORE SERPL CALC.FIBROSURE: 0.23 (ref 0–0.21)
NECROINFLAMM ACTIVITY SCORING:: ABNORMAL
NECROINFLAMMATORY ACT GRADE SERPL QL: ABNORMAL
NECROINFLAMMATORY ACT SCORE SERPL: 0.74 (ref 0–0.17)
SERVICE CMNT-IMP: ABNORMAL
TEST PERFORMANCE INFO SPEC: ABNORMAL

## 2024-11-19 RX ORDER — GLECAPREVIR AND PIBRENTASVIR 40; 100 MG/1; MG/1
3 TABLET, FILM COATED ORAL DAILY
Start: 2024-11-19 | End: 2024-11-25 | Stop reason: SDUPTHER

## 2024-11-19 NOTE — TELEPHONE ENCOUNTER
He has chronic hepatitis C infection, genotype unknown, treatment naïve, without cirrhosis (F0-1). I have prescribed Mavyret 3 tabs po once daily x 8 weeks for treatment.     Immune to Hep B but not A, needs vaccination series.

## 2024-11-22 ENCOUNTER — SPECIALTY PHARMACY (OUTPATIENT)
Dept: PHARMACY | Facility: HOSPITAL | Age: 44
End: 2024-11-22
Payer: MEDICAID

## 2024-11-22 DIAGNOSIS — B18.2 CHRONIC HEPATITIS C WITHOUT HEPATIC COMA: ICD-10-CM

## 2024-11-22 RX ORDER — ATORVASTATIN CALCIUM 10 MG/1
10 TABLET, FILM COATED ORAL NIGHTLY
COMMUNITY

## 2024-11-22 NOTE — PROGRESS NOTES
[facility]  Hepatitis C Initial Assessment     Roque Proctor is a 44 y.o. male diagnosed with Hepatitis C and enrolled in the [facility] for treatment. An initial outreach was conducted with patient's spouse who helps manage his medications this included assessment of therapy appropriateness and specialty medication education for Mavyret.    Previous Hep C Treatment: Patient is treatment naïve.    Relevant Past Medical History and Comorbidities  Past Medical History:   Diagnosis Date    Anxiety     Arthritis     Diabetes mellitus     Hypertension     Substance abuse     Visual impairment      Social History     Socioeconomic History    Marital status: Single   Tobacco Use    Smoking status: Former     Current packs/day: 0.00     Average packs/day: 1 pack/day for 27.0 years (27.0 ttl pk-yrs)     Types: Cigarettes     Start date:      Quit date:      Years since quittin.8     Passive exposure: Never    Smokeless tobacco: Never   Vaping Use    Vaping status: Every Day    Substances: Nicotine, Flavoring    Devices: Disposable    Passive vaping exposure: Yes   Substance and Sexual Activity    Alcohol use: Not Currently    Drug use: Not Currently     Types: Heroin, Methamphetamines     Comment: Quit using both in     Sexual activity: Yes     Partners: Female     Birth control/protection: None       Allergies  Patient has no known allergies.    Insurance Coverage & Financial Support: [employer]    Current Medication List:    Current Outpatient Medications:     amLODIPine (NORVASC) 10 MG tablet, Take 1 tablet by mouth Daily., Disp: 90 tablet, Rfl: 1    bisacodyl (Dulcolax) 5 MG EC tablet, Follow instructions given at office, Disp: 4 tablet, Rfl: 0    fenofibrate (TRICOR) 54 MG tablet, Take 1 tablet by mouth Daily., Disp: 90 tablet, Rfl: 1    Glecaprevir-Pibrentasvir (Mavyret) 100-40 MG tablet, Take 3 tablets by mouth Daily. Take all 3 tablets PO once daily with food, Disp: , Rfl:      losartan (Cozaar) 25 MG tablet, Take 1 tablet by mouth Daily., Disp: 30 tablet, Rfl: 2    Ozempic, 2 MG/DOSE, 8 MG/3ML solution pen-injector, Inject 2 mg under the skin into the appropriate area as directed 1 (One) Time Per Week., Disp: 3 mL, Rfl: 2    polyethylene glycol (GoLYTELY) 236 g solution, Follow instructions given at office, Disp: 4000 mL, Rfl: 0    Testosterone Cypionate (DEPOTESTOTERONE CYPIONATE) 200 MG/ML injection, Inject 1 mL into the appropriate muscle as directed by prescriber Every 14 (Fourteen) Days., Disp: 2 mL, Rfl: 0    vitamin B-12 (CYANOCOBALAMIN) 1000 MCG tablet, Take 1 tablet by mouth Daily., Disp: , Rfl:     vitamin D3 125 MCG (5000 UT) capsule capsule, Take 1 capsule by mouth Daily., Disp: , Rfl:     Drug Interactions:  Medication list was reviewed for drug-drug interactions with Hepatitis C treatment. Reaction with Atorvastatin noted. Patient's authorized POA contact advised that patient needs to hold Atorvastatin during therapy with Mavyret and she voiced understanding.     Relevant Laboratory Values:  Lab Results   Component Value Date    GLUCOSE 226 (H) 10/23/2024    CALCIUM 9.1 10/23/2024     10/23/2024    K 4.0 10/23/2024    CO2 21 10/23/2024     10/23/2024    BUN 11 10/23/2024    CREATININE 0.93 10/23/2024    BCR 12 10/23/2024    AST 69 (H) 10/23/2024     (H) 10/23/2024     Lab Results   Component Value Date    WBC 15.1 (H) 10/23/2024    HGB 15.3 10/23/2024    HCT 47.1 10/23/2024    MCV 86 10/23/2024     10/23/2024       HCV RNA quant: 153,000  HCV genotype: Unknown    Fibrosis: F0-1    FIB-4: 1.08    Immunizations:  Hepatitis A: needs vaccine  Hepatitis B: immune  Lab Results   Component Value Date    HAV Negative 11/15/2024    HEPBCAB Positive (A) 11/15/2024         Co-infection Evaluation:  HIV -- Negative  Hepatitis B -- Negative      Initial Education Provided for [place]  The patient's spouse and authorized NIGHAT contact has been provided with the  following education for MAVYRET (glecaprevir and pibrentasvir). All questions and concerns have been addressed prior to the patient receiving the medication, and the patient's spouse has verbalized understanding of the education and any materials provided. Additional patient education shall be provided and documented upon request by the patient, provider or payer.      The following counseling points for Mavyret (glecaprevir and pibrentasvir) were addressed:  Goal of treatment:  This medication is used to treat hepatitis C infection with the goal of curing infection.  Directions for use:  Take 3 tablets by mouth once daily for a total of 8 weeks. Take tablets at the same time each day, with food.  Missed doses:  It is very important that you do not miss a dose of this medication. Use a pill planner, medication adherence daniela or other tool to help you remember to take your medication.  If you do miss a dose and it is within 18 hours from the usual dosage time, administer dose as soon as possible, then administer next dose at the usual dosage time. If more than 18 hours from the usual dosage time has passed, skip the missed dose and administer the next dose at the usual time.  Do not stop taking this medication without talking to your provider.  Adverse effects:  Frequently reported side effects of this medication include: headache, loss of energy, nausea and diarrhea.   Go to the ED or call 911 with signs of a significant reaction including wheezing; chest tightness; fever; itching; bad cough; blue skin color; seizures; or swelling of face, lips, tongue or throat.   Hepatic decompensation and hepatic failure have been reported. This typically occurs within the first 4 weeks of treatment initiation. Talk to your doctor right away if you notice dark urine, fatigue, lack of appetite, nausea, abdominal pain, light-colored stools, vomiting or yellow skin.  Follow-Up:  Be sure to keep your follow up appointment with our  clinic 4 weeks after starting this medication to ensure you are tolerating it well and that you are responding appropriately to therapy.   Make sure to tell your doctor and pharmacist about all medications you are taking, including herbal supplements and OTC products at each visit. Contact clinic if any new medications are started during treatment.  Storage:  Store this medication at room temperature, away from any extreme temperatures or moisture exposure.    Patient Specific Counseling Points:   If diabetic: Patients with diabetes should closely monitor their blood sugar after starting this medication as it may lead to an improvement in glucose metabolism, potentially resulting in hypoglycemia. Monitor for signs and symptoms of hypoglycemia and follow the rule of 15 to treat hypoglycemia.    Adherence and Self-Administration  Barriers to Patient Adherence and/or Self-Administration: None  Methods for Supporting Patient Adherence and/or Self-Administration: None Required     Associated Vaccinations   Your chronic liver disease puts you at risk for serious complications if you get infected with hepatitis A virus. If you've never been vaccinated against hepatitis A, you need 2 doses of this vaccine, usually spaced 6-19 months apart.     If you already have chronic hepatitis B infection, you won't need a hepatitis B vaccine.  However, if you do not have sufficient Hepatitis B antibodies (either not vaccinated or insufficient response to vaccination), you should get the Hepatitis B vaccination series.  The vaccine is given in 2 or 3 doses, depending on the brand.     A combination A & B vaccination is also available if both are needed.     Contraindications: Severe allergic reaction (eg, anaphylaxis) after a previous dose of any hepatitis A-containing or hepatitis B-containing vaccine or any component of the formulation, including yeast and neomycin.   Precautions: Consider deferring administration in patients with  moderate or severe acute illness. Use with caution in patients with bleeding disorders or severely immunocompromised patients    The patient's authorized NIGHAT contact and spouse, Briana Bartholomew, was counseled that the following immunizations are recommended: Hepatitis A    Goals of Therapy:  Patient Goals of Therapy: Adherence to therapy   Clinical Goals or Therapeutic Targets, If Applicable: SVR 12 weeks    Attestation:  I attest that the initiated specialty medication is appropriate for the patient based on my assessment.  If the prescribed therapy is at any point deemed not appropriate based on the current or future assessments, a consultation will be initiated with the patient's specialty care provider to determine the best course of action. The revised plan of therapy will be documented along with any additional patient education provided.     Assessment & Plan:  Patient has Hepatitis C, genotype unknown (F0-1)(calculated FIB-4 = 1.08) and is treatment naïve. Patient has been prescribed Mavyret 3 tablets daily with food x 8 weeks. Medication education and counseling provided as above.  Patient will obtain medication from [facility] retail pharmacy.  He will hold atorvastatin 10 mg while on Mavyret therapy.   The following immunizations are needed: Hepatitis A.   Patient will follow up in clinic 4 weeks after starting medication to assess virologic response and medication tolerability.     Ty Schultz  11/22/2024  15:18 Herminia MEJIA [facility], have reviewed the note in full and agree with the assessment and plan.  11/25/24  08:54 EST

## 2024-11-25 DIAGNOSIS — E29.1 HYPOGONADISM IN MALE: ICD-10-CM

## 2024-11-25 RX ORDER — GLECAPREVIR AND PIBRENTASVIR 40; 100 MG/1; MG/1
3 TABLET, FILM COATED ORAL DAILY
Qty: 84 TABLET | Refills: 1 | Status: SHIPPED | OUTPATIENT
Start: 2024-11-25

## 2024-11-25 NOTE — TELEPHONE ENCOUNTER
Caller: XIMENA PATEL    Relationship: Emergency Contact    Best call back number: [phone]     Requested Prescriptions:   Requested Prescriptions     Pending Prescriptions Disp Refills    Testosterone Cypionate (DEPOTESTOTERONE CYPIONATE) 200 MG/ML injection 2 mL 0     Sig: Inject 1 mL into the appropriate muscle as directed by prescriber Every 14 (Fourteen) Days.    Ozempic, 2 MG/DOSE, 8 MG/3ML solution pen-injector 3 mL 2     Sig: Inject 2 mg under the skin into the appropriate area as directed 1 (One) Time Per Week.        Pharmacy where request should be sent: [id number] - [place], [place] - [address] AT [facility] [address] [facility] - [phone]  - [phone] FX     Last office visit with prescribing clinician: 11/6/2024   Last telemedicine visit with prescribing clinician: Visit date not found   Next office visit with prescribing clinician: 1/23/2025     Additional details provided by patient: PATIENT IS COMPLETELY OUT OF ONE OF THESE MEDICATIONS      Does the patient have less than a 3 day supply:  [x] Yes  [] No    Would you like a call back once the refill request has been completed: [] Yes [x] No    If the office needs to give you a call back, can they leave a voicemail: [] Yes [x] No    Stephanie Raza Rep   11/25/24 17:04 EST

## 2024-11-26 RX ORDER — SEMAGLUTIDE 2.68 MG/ML
2 INJECTION, SOLUTION SUBCUTANEOUS WEEKLY
Qty: 3 ML | Refills: 2 | Status: SHIPPED | OUTPATIENT
Start: 2024-11-26

## 2024-11-26 RX ORDER — TESTOSTERONE CYPIONATE 200 MG/ML
200 INJECTION, SOLUTION INTRAMUSCULAR
Qty: 2 ML | Refills: 0 | Status: CANCELLED | OUTPATIENT
Start: 2024-11-26

## 2024-11-26 NOTE — TELEPHONE ENCOUNTER
Rx Refill Note  Requested Prescriptions     Pending Prescriptions Disp Refills    Testosterone Cypionate (DEPOTESTOTERONE CYPIONATE) 200 MG/ML injection 2 mL 0     Sig: Inject 1 mL into the appropriate muscle as directed by prescriber Every 14 (Fourteen) Days.     Signed Prescriptions Disp Refills    Ozempic, 2 MG/DOSE, 8 MG/3ML solution pen-injector 3 mL 2     Sig: Inject 2 mg under the skin into the appropriate area as directed 1 (One) Time Per Week.     Authorizing Provider: MAGALI LAWRENCE     Ordering User: DIANELYS GREENWOOD      Last office visit with prescribing clinician: 11/6/2024   Last telemedicine visit with prescribing clinician: Visit date not found   Next office visit with prescribing clinician: 1/23/2025                         Would you like a call back once the refill request has been completed: [] Yes [] No    If the office needs to give you a call back, can they leave a voicemail: [] Yes [] No    Dianelys Greenwood MA  11/26/24, 08:01 EST

## 2024-12-20 ENCOUNTER — LAB (OUTPATIENT)
Dept: LAB | Facility: HOSPITAL | Age: 44
End: 2024-12-20
Payer: MEDICAID

## 2024-12-20 ENCOUNTER — SPECIALTY PHARMACY (OUTPATIENT)
Dept: PHARMACY | Facility: HOSPITAL | Age: 44
End: 2024-12-20
Payer: MEDICAID

## 2024-12-20 VITALS
SYSTOLIC BLOOD PRESSURE: 150 MMHG | WEIGHT: 280 LBS | HEART RATE: 87 BPM | DIASTOLIC BLOOD PRESSURE: 95 MMHG | OXYGEN SATURATION: 99 % | BODY MASS INDEX: 35 KG/M2

## 2024-12-20 DIAGNOSIS — R74.8 ELEVATED LIVER ENZYMES: ICD-10-CM

## 2024-12-20 DIAGNOSIS — B18.2 CHRONIC HEPATITIS C WITHOUT HEPATIC COMA: ICD-10-CM

## 2024-12-20 DIAGNOSIS — B18.2 CHRONIC HEPATITIS C WITHOUT HEPATIC COMA: Primary | ICD-10-CM

## 2024-12-20 LAB
ALBUMIN SERPL-MCNC: 4.1 G/DL (ref 3.5–5.2)
ALBUMIN/GLOB SERPL: 1.1 G/DL
ALP SERPL-CCNC: 96 U/L (ref 39–117)
ALT SERPL W P-5'-P-CCNC: 20 U/L (ref 1–41)
ANION GAP SERPL CALCULATED.3IONS-SCNC: 11 MMOL/L (ref 5–15)
AST SERPL-CCNC: 18 U/L (ref 1–40)
BILIRUB SERPL-MCNC: 0.6 MG/DL (ref 0–1.2)
BUN SERPL-MCNC: 10 MG/DL (ref 6–20)
BUN/CREAT SERPL: 10.8 (ref 7–25)
CALCIUM SPEC-SCNC: 9.3 MG/DL (ref 8.6–10.5)
CHLORIDE SERPL-SCNC: 103 MMOL/L (ref 98–107)
CO2 SERPL-SCNC: 26 MMOL/L (ref 22–29)
CREAT SERPL-MCNC: 0.93 MG/DL (ref 0.76–1.27)
EGFRCR SERPLBLD CKD-EPI 2021: 102.6 ML/MIN/1.73
GLOBULIN UR ELPH-MCNC: 3.7 GM/DL
GLUCOSE SERPL-MCNC: 131 MG/DL (ref 65–99)
POTASSIUM SERPL-SCNC: 3.8 MMOL/L (ref 3.5–5.2)
PROT SERPL-MCNC: 7.8 G/DL (ref 6–8.5)
SODIUM SERPL-SCNC: 140 MMOL/L (ref 136–145)

## 2024-12-20 PROCEDURE — 80053 COMPREHEN METABOLIC PANEL: CPT

## 2024-12-20 PROCEDURE — G0463 HOSPITAL OUTPT CLINIC VISIT: HCPCS

## 2024-12-20 PROCEDURE — 36415 COLL VENOUS BLD VENIPUNCTURE: CPT

## 2024-12-20 PROCEDURE — 87522 HEPATITIS C REVRS TRNSCRPJ: CPT

## 2024-12-20 RX ORDER — MULTIPLE VITAMINS W/ MINERALS TAB 9MG-400MCG
1 TAB ORAL DAILY
COMMUNITY

## 2024-12-20 NOTE — LETTER
2024     KAYLIE Muñoz  [address] [place] [address]    Patient: Roque Proctor   YOB: 1980   Date of Visit: 2024       Dear KAYLIE Muñoz    Roque Proctor was in my office today. Below is a copy of my note.    If you have questions, please do not hesitate to call me. I look forward to following Roque along with you.         Sincerely,        [facility]        CC: No Recipients         Follow Up Note     Date: 2024   Patient Name: Roque Proctor  MRN: [id number]  : 1980     Primary Care Provider: Lizzy Reyes APRN     Chief Complaint   Patient presents with   • Hepatitis C     Pt here for 4 week follow up     History of present illness:   2024  Roque Proctor is a 44 y.o. male who is here today for follow up regarding Hepatitis C.     He has been taking Mavyret 3 tabs PO once daily for Hep C treatment the past 4 weeks. He has not missed any doses. He has not noticed any side effects now. Did have some initial nausea but that has resolved. No current illicit drug use.     Having colonoscopy soon. Would like his instructions re-printed.     Interval History:  2024  He found out about having Hepatitis C infection approx 2 weeks ago. He has not had prior treatment for hepatitis. Reports no known personal history of liver disease including other viral hepatitis. There is no known family history of liver disease or cirrhosis. He admits to previous IVDU and intranasal drug use. Has been clean now for 3 years. He does have nonprofessional tattoos. Denies having previous alcoholism. He does not currently drink alcohol. He denies  current illicit drug use including marijuana. No recent liver imaging. He has had recent labs. He has not had previous vaccinations for Hepatitis A and B. Has been incarcerated in the past. He is currently working full time.       He has not had previous colonoscopy. Father had colon cancer, late 50s.  No constipation or diarrhea. No rectal bleeding. No abdominal pain.      On ozempic. No current nausea or vomiting. No significant reflux symptoms.     Subjective     Past Medical History:   Diagnosis Date   • Anxiety    • Arthritis    • Diabetes mellitus    • Hypertension    • Substance abuse    • Visual impairment      History reviewed. No pertinent surgical history.    Family History   Problem Relation Age of Onset   • Lung cancer Mother    • Diabetes Father    • Colon cancer Father    • Diabetes Maternal Grandmother    • Diabetes Maternal Grandfather    • Diabetes Paternal Grandmother    • Diabetes Paternal Grandfather      Social History     Socioeconomic History   • Marital status: Single   Tobacco Use   • Smoking status: Former     Current packs/day: 0.00     Average packs/day: 1 pack/day for 27.0 years (27.0 ttl pk-yrs)     Types: Cigarettes     Start date:      Quit date:      Years since quittin.9     Passive exposure: Never   • Smokeless tobacco: Never   Vaping Use   • Vaping status: Every Day   • Substances: Nicotine, Flavoring   • Devices: Disposable   • Passive vaping exposure: Yes   Substance and Sexual Activity   • Alcohol use: Not Currently   • Drug use: Not Currently     Types: Heroin, Methamphetamines     Comment: Quit using both in    • Sexual activity: Yes     Partners: Female     Birth control/protection: None     Current Outpatient Medications:   •  amLODIPine (NORVASC) 10 MG tablet, Take 1 tablet by mouth Daily., Disp: 90 tablet, Rfl: 1  •  fenofibrate (TRICOR) 54 MG tablet, Take 1 tablet by mouth Daily., Disp: 90 tablet, Rfl: 1  •  Glecaprevir-Pibrentasvir (Mavyret) 100-40 MG tablet, Take 3 tablets by mouth Daily with food. Hold atorvastatin 10 mg while on therapy., Disp: 84 tablet, Rfl: 1  •  losartan (Cozaar) 25 MG tablet, Take 1 tablet by mouth Daily., Disp: 30 tablet, Rfl: 2  •  multivitamin with minerals tablet tablet, Take 1 tablet by mouth Daily., Disp: , Rfl:   •   Ozempic, 2 MG/DOSE, 8 MG/3ML solution pen-injector, Inject 2 mg under the skin into the appropriate area as directed 1 (One) Time Per Week., Disp: 3 mL, Rfl: 2  •  Testosterone Cypionate (DEPOTESTOTERONE CYPIONATE) 200 MG/ML injection, Inject 1 mL into the appropriate muscle as directed by prescriber Every 14 (Fourteen) Days., Disp: 2 mL, Rfl: 0  •  vitamin B-12 (CYANOCOBALAMIN) 1000 MCG tablet, Take 1 tablet by mouth Daily., Disp: , Rfl:   •  vitamin D3 125 MCG (5000 UT) capsule capsule, Take 1 capsule by mouth Daily., Disp: , Rfl:   •  atorvastatin (LIPITOR) 10 MG tablet, Take 1 tablet by mouth Every Night. (Patient not taking: Reported on 12/20/2024), Disp: , Rfl:   •  metFORMIN (GLUCOPHAGE) 500 MG tablet, Take 1 tablet by mouth 2 (Two) Times a Day With Meals. (Patient not taking: Reported on 12/20/2024), Disp: , Rfl:     No Known Allergies    The following portions of the patient's history were reviewed and updated as appropriate: allergies, current medications, past family history, past medical history, past social history, past surgical history and problem list.    Objective     Physical Exam  Constitutional:       General: He is not in acute distress.     Appearance: Normal appearance. He is well-developed. He is not diaphoretic.   HENT:      Head: Normocephalic and atraumatic.      Right Ear: External ear normal.      Left Ear: External ear normal.      Nose: Nose normal.   Eyes:      General: No scleral icterus.        Right eye: No discharge.         Left eye: No discharge.      Conjunctiva/sclera: Conjunctivae normal.   Neck:      Trachea: No tracheal deviation.   Pulmonary:      Effort: Pulmonary effort is normal. No respiratory distress.   Musculoskeletal:         General: Normal range of motion.      Cervical back: Normal range of motion.   Skin:     Coloration: Skin is not pale.      Findings: No erythema or rash.   Neurological:      Mental Status: He is alert and oriented to person, place, and time.       Coordination: Coordination normal.   Psychiatric:         Mood and Affect: Mood normal.         Behavior: Behavior normal.         Thought Content: Thought content normal.         Judgment: Judgment normal.       Vitals:    12/20/24 1153   BP: 150/95   Pulse: 87   SpO2: 99%   Weight: 127 kg (280 lb)     Results Review:   I reviewed the patient's new clinical results.    Lab on 11/15/2024   Component Date Value Ref Range Status   • HIV DUO 11/15/2024 Non-Reactive  Non-Reactive Final   • Fibrosis Score 11/15/2024 0.23 (H)  0.00 - 0.21 Final   • Fibrosis Stage 11/15/2024 F0-F1   Final   • Necroinflammat Activity Score 11/15/2024 0.74 (H)  0.00 - 0.17 Final   • Necroinflammat Activity Grade 11/15/2024 A3-Severe activity   Final   • Methodology: 11/15/2024 Comment   Final    The analytes tested are performed by [employer] methods.  Not intended for use with other diagnostic considerations.   • Alpha 2-Macroglobulins, Qn 11/15/2024 142  110 - 276 mg/dL Final   • Haptoglobin 11/15/2024 92  23 - 355 mg/dL Final   • Apolipoprotein A-1 11/15/2024 123  101 - 178 mg/dL Final   • Total Bilirubin 11/15/2024 0.7  0.0 - 1.2 mg/dL Final   • GGT 11/15/2024 37  0 - 65 IU/L Final   • ALT (SGPT) 11/15/2024 168 (H)  0 - 55 IU/L Final   • Hep B Core Total Ab 11/15/2024 Positive (A)  Negative Final   • Hep B S Ab 11/15/2024 Reactive   Final   • Hepatitis B Surface Ag 11/15/2024 Non-Reactive  Non-Reactive Final   • Protime 11/15/2024 14.7  12.3 - 15.1 Seconds Final   • INR 11/15/2024 1.09  0.90 - 1.10 Final   Office Visit on 10/23/2024   Component Date Value Ref Range Status   • Hep C Virus Ab 10/23/2024 Reactive (A)  Non Reactive Final    Comment: HCV antibody alone does not differentiate between previously  resolved infection and active infection. Equivocal and Reactive  HCV antibody results should be followed up with an HCV RNA test  to support the diagnosis of active HCV infection.     • PSA 10/23/2024 1.3  0.0 - 4.0 ng/mL  Final    Comment: Roche ECLIA methodology.  According to the American U[employer] Association, Serum PSA should  decrease and remain at undetectable levels after radical  prostatectomy. The AUA defines biochemical recurrence as an initial  PSA value 0.2 ng/mL or greater followed by a subsequent confirmatory  PSA value 0.2 ng/mL or greater.  Values obtained with different assay methods or kits cannot be used  interchangeably. Results cannot be interpreted as absolute evidence  of the presence or absence of malignant disease.     • Hemoglobin A1C 10/23/2024 7.5 (H)  4.8 - 5.6 % Final    Comment:          Prediabetes: 5.7 - 6.4           Diabetes: >6.4           Glycemic control for adults with diabetes: <7.0     • WBC 10/23/2024 15.1 (H)  3.4 - 10.8 x10E3/uL Final   • RBC 10/23/2024 5.51  4.14 - 5.80 x10E6/uL Final   • Hemoglobin 10/23/2024 15.3  13.0 - 17.7 g/dL Final   • Hematocrit 10/23/2024 47.1  37.5 - 51.0 % Final   • MCV 10/23/2024 86  79 - 97 fL Final   • MCH 10/23/2024 27.8  26.6 - 33.0 pg Final   • MCHC 10/23/2024 32.5  31.5 - 35.7 g/dL Final   • RDW 10/23/2024 13.1  11.6 - 15.4 % Final   • Platelets 10/23/2024 226  150 - 450 x10E3/uL Final   • Neutrophil Rel % 10/23/2024 48  Not Estab. % Final   • Lymphocyte Rel % 10/23/2024 44  Not Estab. % Final   • Monocyte Rel % 10/23/2024 5  Not Estab. % Final   • Eosinophil Rel % 10/23/2024 1  Not Estab. % Final   • Basophil Rel % 10/23/2024 1  Not Estab. % Final   • Neutrophils Absolute 10/23/2024 7.3 (H)  1.4 - 7.0 x10E3/uL Final   • Lymphocytes Absolute 10/23/2024 6.7 (H)  0.7 - 3.1 x10E3/uL Final   • Monocytes Absolute 10/23/2024 0.8  0.1 - 0.9 x10E3/uL Final   • Eosinophils Absolute 10/23/2024 0.2  0.0 - 0.4 x10E3/uL Final   • Basophils Absolute 10/23/2024 0.1  0.0 - 0.2 x10E3/uL Final   • Immature Granulocyte Rel % 10/23/2024 1  Not Estab. % Final   • Immature Grans Absolute 10/23/2024 0.1  0.0 - 0.1 x10E3/uL Final   • Glucose 10/23/2024 226 (H)  70 - 99 mg/dL  Final   • BUN 10/23/2024 11  6 - 24 mg/dL Final   • Creatinine 10/23/2024 0.93  0.76 - 1.27 mg/dL Final   • EGFR Result 10/23/2024 103  >59 mL/min/1.73 Final   • BUN/Creatinine Ratio 10/23/2024 12  9 - 20 Final   • Sodium 10/23/2024 138  134 - 144 mmol/L Final   • Potassium 10/23/2024 4.0  3.5 - 5.2 mmol/L Final   • Chloride 10/23/2024 100  96 - 106 mmol/L Final   • Total CO2 10/23/2024 21  20 - 29 mmol/L Final   • Calcium 10/23/2024 9.1  8.7 - 10.2 mg/dL Final   • Total Protein 10/23/2024 7.1  6.0 - 8.5 g/dL Final   • Albumin 10/23/2024 4.4  4.1 - 5.1 g/dL Final   • Globulin 10/23/2024 2.7  1.5 - 4.5 g/dL Final   • Total Bilirubin 10/23/2024 0.5  0.0 - 1.2 mg/dL Final   • Alkaline Phosphatase 10/23/2024 82  44 - 121 IU/L Final   • AST (SGOT) 10/23/2024 69 (H)  0 - 40 IU/L Final   • ALT (SGPT) 10/23/2024 165 (H)  0 - 44 IU/L Final   • Total Cholesterol 10/23/2024 152  100 - 199 mg/dL Final   • Triglycerides 10/23/2024 200 (H)  0 - 149 mg/dL Final   • HDL Cholesterol 10/23/2024 28 (L)  >39 mg/dL Final   • VLDL Cholesterol Harris 10/23/2024 34  5 - 40 mg/dL Final   • LDL Chol Calc (NIH) 10/23/2024 90  0 - 99 mg/dL Final   • HCV, RNA, QUANT 10/31/2024 153,000  IU/mL Final    Comment:                 HCV RNA detected  HCV RNA viral loads >/= 25 IU/mL indicate current HCV infection.     • HCV RNA, log10 10/31/2024 5.185  log10 IU/mL Final   • Test Information 10/31/2024 Comment   Final    The quantitative range of this assay is 15 IU/mL to 100 million IU/mL.      No radiology results for the last 90 days.     Assessment / Plan      1. Chronic hepatitis C without hepatic coma  2. Elevated liver enzymes  He has chronic Hepatitis C infection, genotype unknown, treatment naive, without cirrhosis (F0-1). He has been taking Mavyret 3 tabs PO once daily for the past 4 weeks  for Hepatitis C therapy. He will continue to take this medication at the same time every day without missing any doses for total duration of 8 weeks. Hep C  viral load lab (HCV RNA quant) and CMP will be checked today which is approx 4 weeks after start of therapy. Will have labs again at end of therapy and final labs 3 months s/p therapy to determine response to treatment and cure. He will continue to abstain from alcohol use at this time and agrees not to use illegal drugs. He understands to avoid any high risk behavior to prevent any reinfection during treatment and after treatment. Had hepatocellular pattern elevated liver enzymes prior to start of treatment, will monitor.     He is immune to hepatitis B but not A, vaccination series recommended  Continue Mavyret  Labs today    - Hepatitis C RNA, Quantitative, PCR (graph); Future  - Comprehensive Metabolic Panel; Future       Prior history  Encounter for colorectal cancer screening  He has not had previous colonoscopy. Father had colon cancer, late 50s.  No current GI complaints.  Has screening colonoscopy arranged already, will keep appt. Instructions for prep re-printed.     Follow Up:   Return in about 4 months (around 4/20/2025) for recheck Hepatitis C.    America Villegas PA-C  [facility]  12/20/2024  12:12 EST    Dictated Utilizing Dragon Dictation: Part of this note may be an electronic transcription/translation of spoken language to printed text using the Dragon Dictation System.

## 2024-12-20 NOTE — PROGRESS NOTES
Follow Up Note     Date: 2024   Patient Name: Roque Proctor  MRN: [id number]  : 1980     Primary Care Provider: Lizzy Reyes APRN     Chief Complaint   Patient presents with    Hepatitis C     Pt here for 4 week follow up     History of present illness:   2024  Roque Proctor is a 44 y.o. male who is here today for follow up regarding Hepatitis C.     He has been taking Mavyret 3 tabs PO once daily for Hep C treatment the past 4 weeks. He has not missed any doses. He has not noticed any side effects now. Did have some initial nausea but that has resolved. No current illicit drug use.     Having colonoscopy soon. Would like his instructions re-printed.     Interval History:  2024  He found out about having Hepatitis C infection approx 2 weeks ago. He has not had prior treatment for hepatitis. Reports no known personal history of liver disease including other viral hepatitis. There is no known family history of liver disease or cirrhosis. He admits to previous IVDU and intranasal drug use. Has been clean now for 3 years. He does have nonprofessional tattoos. Denies having previous alcoholism. He does not currently drink alcohol. He denies  current illicit drug use including marijuana. No recent liver imaging. He has had recent labs. He has not had previous vaccinations for Hepatitis A and B. Has been incarcerated in the past. He is currently working full time.       He has not had previous colonoscopy. Father had colon cancer, late 50s. No constipation or diarrhea. No rectal bleeding. No abdominal pain.      On ozempic. No current nausea or vomiting. No significant reflux symptoms.     Subjective     Past Medical History:   Diagnosis Date    Anxiety     Arthritis     Diabetes mellitus     Hypertension     Substance abuse     Visual impairment      History reviewed. No pertinent surgical history.    Family History   Problem Relation Age of Onset    Lung cancer Mother     Diabetes Father      Colon cancer Father     Diabetes Maternal Grandmother     Diabetes Maternal Grandfather     Diabetes Paternal Grandmother     Diabetes Paternal Grandfather      Social History     Socioeconomic History    Marital status: Single   Tobacco Use    Smoking status: Former     Current packs/day: 0.00     Average packs/day: 1 pack/day for 27.0 years (27.0 ttl pk-yrs)     Types: Cigarettes     Start date:      Quit date:      Years since quittin.9     Passive exposure: Never    Smokeless tobacco: Never   Vaping Use    Vaping status: Every Day    Substances: Nicotine, Flavoring    Devices: Disposable    Passive vaping exposure: Yes   Substance and Sexual Activity    Alcohol use: Not Currently    Drug use: Not Currently     Types: Heroin, Methamphetamines     Comment: Quit using both in     Sexual activity: Yes     Partners: Female     Birth control/protection: None     Current Outpatient Medications:     amLODIPine (NORVASC) 10 MG tablet, Take 1 tablet by mouth Daily., Disp: 90 tablet, Rfl: 1    fenofibrate (TRICOR) 54 MG tablet, Take 1 tablet by mouth Daily., Disp: 90 tablet, Rfl: 1    Glecaprevir-Pibrentasvir (Mavyret) 100-40 MG tablet, Take 3 tablets by mouth Daily with food. Hold atorvastatin 10 mg while on therapy., Disp: 84 tablet, Rfl: 1    losartan (Cozaar) 25 MG tablet, Take 1 tablet by mouth Daily., Disp: 30 tablet, Rfl: 2    multivitamin with minerals tablet tablet, Take 1 tablet by mouth Daily., Disp: , Rfl:     Ozempic, 2 MG/DOSE, 8 MG/3ML solution pen-injector, Inject 2 mg under the skin into the appropriate area as directed 1 (One) Time Per Week., Disp: 3 mL, Rfl: 2    Testosterone Cypionate (DEPOTESTOTERONE CYPIONATE) 200 MG/ML injection, Inject 1 mL into the appropriate muscle as directed by prescriber Every 14 (Fourteen) Days., Disp: 2 mL, Rfl: 0    vitamin B-12 (CYANOCOBALAMIN) 1000 MCG tablet, Take 1 tablet by mouth Daily., Disp: , Rfl:     vitamin D3 125 MCG (5000 UT) capsule capsule,  Take 1 capsule by mouth Daily., Disp: , Rfl:     atorvastatin (LIPITOR) 10 MG tablet, Take 1 tablet by mouth Every Night. (Patient not taking: Reported on 12/20/2024), Disp: , Rfl:     metFORMIN (GLUCOPHAGE) 500 MG tablet, Take 1 tablet by mouth 2 (Two) Times a Day With Meals. (Patient not taking: Reported on 12/20/2024), Disp: , Rfl:     No Known Allergies    The following portions of the patient's history were reviewed and updated as appropriate: allergies, current medications, past family history, past medical history, past social history, past surgical history and problem list.    Objective     Physical Exam  Constitutional:       General: He is not in acute distress.     Appearance: Normal appearance. He is well-developed. He is not diaphoretic.   HENT:      Head: Normocephalic and atraumatic.      Right Ear: External ear normal.      Left Ear: External ear normal.      Nose: Nose normal.   Eyes:      General: No scleral icterus.        Right eye: No discharge.         Left eye: No discharge.      Conjunctiva/sclera: Conjunctivae normal.   Neck:      Trachea: No tracheal deviation.   Pulmonary:      Effort: Pulmonary effort is normal. No respiratory distress.   Musculoskeletal:         General: Normal range of motion.      Cervical back: Normal range of motion.   Skin:     Coloration: Skin is not pale.      Findings: No erythema or rash.   Neurological:      Mental Status: He is alert and oriented to person, place, and time.      Coordination: Coordination normal.   Psychiatric:         Mood and Affect: Mood normal.         Behavior: Behavior normal.         Thought Content: Thought content normal.         Judgment: Judgment normal.       Vitals:    12/20/24 1153   BP: 150/95   Pulse: 87   SpO2: 99%   Weight: 127 kg (280 lb)     Results Review:   I reviewed the patient's new clinical results.    Lab on 11/15/2024   Component Date Value Ref Range Status    HIV DUO 11/15/2024 Non-Reactive  Non-Reactive Final     Fibrosis Score 11/15/2024 0.23 (H)  0.00 - 0.21 Final    Fibrosis Stage 11/15/2024 F0-F1   Final    Necroinflammat Activity Score 11/15/2024 0.74 (H)  0.00 - 0.17 Final    Necroinflammat Activity Grade 11/15/2024 A3-Severe activity   Final    Methodology: 11/15/2024 Comment   Final    The analytes tested are performed by [employer] methods.  Not intended for use with other diagnostic considerations.    Alpha 2-Macroglobulins, Qn 11/15/2024 142  110 - 276 mg/dL Final    Haptoglobin 11/15/2024 92  23 - 355 mg/dL Final    Apolipoprotein A-1 11/15/2024 123  101 - 178 mg/dL Final    Total Bilirubin 11/15/2024 0.7  0.0 - 1.2 mg/dL Final    GGT 11/15/2024 37  0 - 65 IU/L Final    ALT (SGPT) 11/15/2024 168 (H)  0 - 55 IU/L Final    Hep B Core Total Ab 11/15/2024 Positive (A)  Negative Final    Hep B S Ab 11/15/2024 Reactive   Final    Hepatitis B Surface Ag 11/15/2024 Non-Reactive  Non-Reactive Final    Protime 11/15/2024 14.7  12.3 - 15.1 Seconds Final    INR 11/15/2024 1.09  0.90 - 1.10 Final   Office Visit on 10/23/2024   Component Date Value Ref Range Status    Hep C Virus Ab 10/23/2024 Reactive (A)  Non Reactive Final    Comment: HCV antibody alone does not differentiate between previously  resolved infection and active infection. Equivocal and Reactive  HCV antibody results should be followed up with an HCV RNA test  to support the diagnosis of active HCV infection.      PSA 10/23/2024 1.3  0.0 - 4.0 ng/mL Final    Comment: Roche ECLIA methodology.  According to the American Urological Association, Serum PSA should  decrease and remain at undetectable levels after radical  prostatectomy. The AUA defines biochemical recurrence as an initial  PSA value 0.2 ng/mL or greater followed by a subsequent confirmatory  PSA value 0.2 ng/mL or greater.  Values obtained with different assay methods or kits cannot be used  interchangeably. Results cannot be interpreted as absolute evidence  of the presence or absence of  malignant disease.      Hemoglobin A1C 10/23/2024 7.5 (H)  4.8 - 5.6 % Final    Comment:          Prediabetes: 5.7 - 6.4           Diabetes: >6.4           Glycemic control for adults with diabetes: <7.0      WBC 10/23/2024 15.1 (H)  3.4 - 10.8 x10E3/uL Final    RBC 10/23/2024 5.51  4.14 - 5.80 x10E6/uL Final    Hemoglobin 10/23/2024 15.3  13.0 - 17.7 g/dL Final    Hematocrit 10/23/2024 47.1  37.5 - 51.0 % Final    MCV 10/23/2024 86  79 - 97 fL Final    MCH 10/23/2024 27.8  26.6 - 33.0 pg Final    MCHC 10/23/2024 32.5  31.5 - 35.7 g/dL Final    RDW 10/23/2024 13.1  11.6 - 15.4 % Final    Platelets 10/23/2024 226  150 - 450 x10E3/uL Final    Neutrophil Rel % 10/23/2024 48  Not Estab. % Final    Lymphocyte Rel % 10/23/2024 44  Not Estab. % Final    Monocyte Rel % 10/23/2024 5  Not Estab. % Final    Eosinophil Rel % 10/23/2024 1  Not Estab. % Final    Basophil Rel % 10/23/2024 1  Not Estab. % Final    Neutrophils Absolute 10/23/2024 7.3 (H)  1.4 - 7.0 x10E3/uL Final    Lymphocytes Absolute 10/23/2024 6.7 (H)  0.7 - 3.1 x10E3/uL Final    Monocytes Absolute 10/23/2024 0.8  0.1 - 0.9 x10E3/uL Final    Eosinophils Absolute 10/23/2024 0.2  0.0 - 0.4 x10E3/uL Final    Basophils Absolute 10/23/2024 0.1  0.0 - 0.2 x10E3/uL Final    Immature Granulocyte Rel % 10/23/2024 1  Not Estab. % Final    Immature Grans Absolute 10/23/2024 0.1  0.0 - 0.1 x10E3/uL Final    Glucose 10/23/2024 226 (H)  70 - 99 mg/dL Final    BUN 10/23/2024 11  6 - 24 mg/dL Final    Creatinine 10/23/2024 0.93  0.76 - 1.27 mg/dL Final    EGFR Result 10/23/2024 103  >59 mL/min/1.73 Final    BUN/Creatinine Ratio 10/23/2024 12  9 - 20 Final    Sodium 10/23/2024 138  134 - 144 mmol/L Final    Potassium 10/23/2024 4.0  3.5 - 5.2 mmol/L Final    Chloride 10/23/2024 100  96 - 106 mmol/L Final    Total CO2 10/23/2024 21  20 - 29 mmol/L Final    Calcium 10/23/2024 9.1  8.7 - 10.2 mg/dL Final    Total Protein 10/23/2024 7.1  6.0 - 8.5 g/dL Final    Albumin 10/23/2024  4.4  4.1 - 5.1 g/dL Final    Globulin 10/23/2024 2.7  1.5 - 4.5 g/dL Final    Total Bilirubin 10/23/2024 0.5  0.0 - 1.2 mg/dL Final    Alkaline Phosphatase 10/23/2024 82  44 - 121 IU/L Final    AST (SGOT) 10/23/2024 69 (H)  0 - 40 IU/L Final    ALT (SGPT) 10/23/2024 165 (H)  0 - 44 IU/L Final    Total Cholesterol 10/23/2024 152  100 - 199 mg/dL Final    Triglycerides 10/23/2024 200 (H)  0 - 149 mg/dL Final    HDL Cholesterol 10/23/2024 28 (L)  >39 mg/dL Final    VLDL Cholesterol Harris 10/23/2024 34  5 - 40 mg/dL Final    LDL Chol Calc (NIH) 10/23/2024 90  0 - 99 mg/dL Final    HCV, RNA, QUANT 10/31/2024 153,000  IU/mL Final    Comment:                 HCV RNA detected  HCV RNA viral loads >/= 25 IU/mL indicate current HCV infection.      HCV RNA, log10 10/31/2024 5.185  log10 IU/mL Final    Test Information 10/31/2024 Comment   Final    The quantitative range of this assay is 15 IU/mL to 100 million IU/mL.      No radiology results for the last 90 days.     Assessment / Plan      1. Chronic hepatitis C without hepatic coma  2. Elevated liver enzymes  He has chronic Hepatitis C infection, genotype unknown, treatment naive, without cirrhosis (F0-1). He has been taking Mavyret 3 tabs PO once daily for the past 4 weeks  for Hepatitis C therapy. He will continue to take this medication at the same time every day without missing any doses for total duration of 8 weeks. Hep C viral load lab (HCV RNA quant) and CMP will be checked today which is approx 4 weeks after start of therapy. Will have labs again at end of therapy and final labs 3 months s/p therapy to determine response to treatment and cure. He will continue to abstain from alcohol use at this time and agrees not to use illegal drugs. He understands to avoid any high risk behavior to prevent any reinfection during treatment and after treatment. Had hepatocellular pattern elevated liver enzymes prior to start of treatment, will monitor.     He is immune to hepatitis  B but not A, vaccination series recommended  Continue Mavyret  Labs today    - Hepatitis C RNA, Quantitative, PCR (graph); Future  - Comprehensive Metabolic Panel; Future       Prior history  Encounter for colorectal cancer screening  He has not had previous colonoscopy. Father had colon cancer, late 50s.  No current GI complaints.  Has screening colonoscopy arranged already, will keep appt. Instructions for prep re-printed.     Follow Up:   Return in about 4 months (around 4/20/2025) for recheck Hepatitis C.    America Villegas PA-C  [facility] [place]  12/20/2024  12:12 EST    Dictated Utilizing Dragon Dictation: Part of this note may be an electronic transcription/translation of spoken language to printed text using the Dragon Dictation System.

## 2024-12-23 ENCOUNTER — TELEPHONE (OUTPATIENT)
Dept: GASTROENTEROLOGY | Facility: CLINIC | Age: 44
End: 2024-12-23
Payer: MEDICAID

## 2024-12-23 DIAGNOSIS — B18.2 CHRONIC HEPATITIS C WITHOUT HEPATIC COMA: Primary | ICD-10-CM

## 2024-12-23 LAB
HCV RNA SERPL NAA+PROBE-ACNC: NORMAL IU/ML
REF LAB TEST REF RANGE: NORMAL

## 2024-12-23 NOTE — TELEPHONE ENCOUNTER
Has been taking Mavyret now for approx 4 weeks for Hep C. HCV RNA not detected on labs. He should have labs again at end of treatment. Please let him know. Thanks.

## 2024-12-27 ENCOUNTER — SPECIALTY PHARMACY (OUTPATIENT)
Dept: PHARMACY | Facility: HOSPITAL | Age: 44
End: 2024-12-27
Payer: MEDICAID

## 2024-12-27 NOTE — PROGRESS NOTES
[facility]  Specialty Pharmacy Patient Management Program  Hepatitis C Reassessment     Roque Proctor was [facility] by their provider [facility] for Hepatitis C treatment program. A follow-up outreach was conducted, including assessment of continued therapy appropriateness, medication adherence, and side effect incidence and management for Mavyret.    Patient had a 4-week follow-up in clinic on 24 and reported taking Mavyret as directed. He stated that he did have nausea at the beginning of treatment which has now resolved. He reports no missed doses and picked up refill after appointment.     Changes to Insurance Coverage or Financial Support  None     Relevant Past Medical History and Comorbidities  Relevant medical history and concomitant health conditions were discussed with the patient. The patient's chart has been reviewed for relevant past medical history and comorbid health conditions and updated as necessary.   Past Medical History:   Diagnosis Date    Anxiety     Arthritis     Diabetes mellitus     Hepatitis C     Hypertension     Substance abuse     Visual impairment      Social History     Socioeconomic History    Marital status: Single   Tobacco Use    Smoking status: Former     Current packs/day: 0.00     Average packs/day: 1 pack/day for 27.0 years (27.0 ttl pk-yrs)     Types: Cigarettes     Start date:      Quit date: 2020     Years since quittin.9     Passive exposure: Never    Smokeless tobacco: Never   Vaping Use    Vaping status: Every Day    Substances: Nicotine, Flavoring    Devices: Disposable    Passive vaping exposure: Yes   Substance and Sexual Activity    Alcohol use: Not Currently    Drug use: Not Currently     Types: Heroin, Methamphetamines     Comment: Quit using both in     Sexual activity: Yes     Partners: Female     Birth control/protection: None          Hospitalizations and Urgent Care Since Last Assessment  ED Visits, Admissions,  or Hospitalizations: None   Urgent Office Visits: None     Allergies  Known allergies and reactions were discussed with the patient. The patient's chart has been reviewed for allergy information and updated as necessary.   No Known Allergies  Allergies reviewed by Herminia Colorado RP on 12/27/2024 at  3:57 PM    Relevant Laboratory Values  Relevant laboratory values were discussed with the patient. The following specialty medication dose adjustment(s) are recommended: None     HCV RNA Quantitative:   10/31/24: [id number] IU/mL  12/20/24: HCV Not Detected     Lab Results   Component Value Date    GLUCOSE 131 (H) 12/20/2024    BUN 10 12/20/2024    CREATININE 0.93 12/20/2024     12/20/2024    K 3.8 12/20/2024     12/20/2024    CALCIUM 9.3 12/20/2024    PROTEINTOT 7.8 12/20/2024    ALBUMIN 4.1 12/20/2024    ALT 20 12/20/2024    AST 18 12/20/2024    ALKPHOS 96 12/20/2024    BILITOT 0.6 12/20/2024    GLOB 3.7 12/20/2024    AGRATIO 1.1 12/20/2024    BCR 10.8 12/20/2024    ANIONGAP 11.0 12/20/2024    EGFR 102.6 12/20/2024         Lab Results   Component Value Date    CHLPL 152 10/23/2024    TRIG 200 (H) 10/23/2024    HDL 28 (L) 10/23/2024    LDL 90 10/23/2024         Current Medication List  This medication list has been reviewed with the patient and evaluated for any interactions or necessary modifications/recommendations, and updated to include all prescription medications, OTC medications, and supplements the patient is currently taking.  This list reflects what is contained in the patient's profile, which has also been marked as reviewed to communicate to other providers it is the most up to date version of the patient's current medication therapy.     Current Outpatient Medications:     fenofibrate (TRICOR) 54 MG tablet, Take 1 tablet by mouth Daily., Disp: 90 tablet, Rfl: 1    amLODIPine (NORVASC) 10 MG tablet, Take 1 tablet by mouth Daily., Disp: 90 tablet, Rfl: 1    atorvastatin (LIPITOR) 10 MG tablet,  Take 1 tablet by mouth Every Night. (Patient not taking: Reported on 12/20/2024), Disp: , Rfl:     Glecaprevir-Pibrentasvir (Mavyret) 100-40 MG tablet, Take 3 tablets by mouth Daily with food. Hold atorvastatin 10 mg while on therapy., Disp: 84 tablet, Rfl: 1    losartan (Cozaar) 25 MG tablet, Take 1 tablet by mouth Daily., Disp: 30 tablet, Rfl: 2    multivitamin with minerals tablet tablet, Take 1 tablet by mouth Daily., Disp: , Rfl:     Ozempic, 2 MG/DOSE, 8 MG/3ML solution pen-injector, Inject 2 mg under the skin into the appropriate area as directed 1 (One) Time Per Week., Disp: 3 mL, Rfl: 2    polyethylene glycol (GoLYTELY) 236 g solution, Follow instructions given at office, Disp: 4000 mL, Rfl: 0    Testosterone Cypionate (DEPOTESTOTERONE CYPIONATE) 200 MG/ML injection, Inject 1 mL into the appropriate muscle as directed by prescriber Every 14 (Fourteen) Days., Disp: 2 mL, Rfl: 0    vitamin B-12 (CYANOCOBALAMIN) 1000 MCG tablet, Take 1 tablet by mouth Daily., Disp: , Rfl:     vitamin D3 125 MCG (5000 UT) capsule capsule, Take 1 capsule by mouth Daily., Disp: , Rfl:     Medicines reviewed by Herminia Colorado [facility] on 12/27/2024 at  3:58 PM    Drug Interactions  None     Adverse Drug Reactions  Medication tolerability: Tolerating with no to minimal ADRs  Medication plan: Continue therapy with normal follow-up    Plan for ADR Management: No ADRs reported     Adherence, Self-Administration, and Current Therapy Problems  Adherence related to the patient's specialty therapy was discussed with the patient. The Adherence segment of this outreach has been reviewed and updated.     Adherence Questions  Linked Medication(s) Assessed: Glecaprevir-Pibrentasvir (Mavyret)  On average, how many doses/injections does the patient miss per month?: 0  What are the identified reasons for non-adherence or missed doses? : no problems identified  What is the estimated medication adherence level?: %  Based on the  patient/caregiver response and refill history, does this patient require an MTP to track adherence improvements?: no    Additional Barriers to Patient Self-Administration: None   Methods for Supporting Patient Self-Administration: None     Open Medication Therapy Problems  No medication therapy recommendations to display    Goals of Therapy  Goals related to the patient's specialty therapy were discussed with the patient. The Patient Goals segment of this outreach has been reviewed and updated.   Goals Addressed Today        Specialty Pharmacy General Goal      HCV RNA undetectable 12-weeks post treatment completion               Quality of Life Assessment   Quality of Life related to the patient's enrollment in the patient management program and services provided was discussed with the patient. The QOL segment of this outreach has been reviewed and updated.  Quality of Life Improvement Scale: 10-Significantly better    Reassessment Plan & Follow-Up  1. Medication Therapy Changes: No changes to therapy.  2. Related Plans, Therapy Recommendations, or Issues to Be Addressed: Will repeat end of treatment labs on ~1/24/25.   3. Pharmacist to repeat assessment in 4-weeks after end of treatment labs performed.  4. Care Coordinator to set up future refill outreaches, coordinate prescription delivery, and escalate clinical questions to pharmacist.    Attestation  Therapeutic appropriateness: Appropriate   I attest the patient was actively involved in and has agreed to the above plan of care.  If the prescribed therapy is at any point deemed not appropriate based on the current or future assessments, a consultation will be initiated with the patient's specialty care provider to determine the best course of action. The revised plan of therapy will be documented along with any required assessments and/or additional patient education provided.     Herminia Colorado [facility]  12/27/2024  16:08 EST

## 2025-01-06 ENCOUNTER — HOSPITAL ENCOUNTER (OUTPATIENT)
Facility: HOSPITAL | Age: 45
Setting detail: HOSPITAL OUTPATIENT SURGERY
Discharge: HOME | End: 2025-01-06
Attending: INTERNAL MEDICINE | Admitting: INTERNAL MEDICINE
Payer: MEDICAID

## 2025-01-06 ENCOUNTER — ANESTHESIA EVENT (OUTPATIENT)
Dept: GASTROENTEROLOGY | Facility: HOSPITAL | Age: 45
End: 2025-01-06
Payer: MEDICAID

## 2025-01-06 ENCOUNTER — ANESTHESIA (OUTPATIENT)
Dept: GASTROENTEROLOGY | Facility: HOSPITAL | Age: 45
End: 2025-01-06
Payer: MEDICAID

## 2025-01-06 VITALS
HEIGHT: 75 IN | TEMPERATURE: 97.8 F | DIASTOLIC BLOOD PRESSURE: 97 MMHG | WEIGHT: 280 LBS | BODY MASS INDEX: 34.82 KG/M2 | RESPIRATION RATE: 18 BRPM | HEART RATE: 74 BPM | SYSTOLIC BLOOD PRESSURE: 132 MMHG | OXYGEN SATURATION: 95 %

## 2025-01-06 DIAGNOSIS — Z12.12 ENCOUNTER FOR COLORECTAL CANCER SCREENING: ICD-10-CM

## 2025-01-06 DIAGNOSIS — Z12.11 ENCOUNTER FOR COLORECTAL CANCER SCREENING: ICD-10-CM

## 2025-01-06 LAB — GLUCOSE BLDC GLUCOMTR-MCNC: 224 MG/DL (ref 70–130)

## 2025-01-06 PROCEDURE — 45385 COLONOSCOPY W/LESION REMOVAL: CPT | Performed by: INTERNAL MEDICINE

## 2025-01-06 PROCEDURE — 82948 REAGENT STRIP/BLOOD GLUCOSE: CPT

## 2025-01-06 PROCEDURE — 25010000002 LIDOCAINE (CARDIAC): Performed by: NURSE ANESTHETIST, CERTIFIED REGISTERED

## 2025-01-06 PROCEDURE — 25010000002 PROPOFOL 10 MG/ML EMULSION: Performed by: NURSE ANESTHETIST, CERTIFIED REGISTERED

## 2025-01-06 PROCEDURE — 25010000002 FENTANYL CITRATE (PF) 50 MCG/ML SOLUTION PREFILLED SYRINGE: Performed by: NURSE ANESTHETIST, CERTIFIED REGISTERED

## 2025-01-06 RX ORDER — FENTANYL CITRATE 50 UG/ML
INJECTION, SOLUTION INTRAMUSCULAR; INTRAVENOUS AS NEEDED
Status: DISCONTINUED | OUTPATIENT
Start: 2025-01-06 | End: 2025-01-06 | Stop reason: SURG

## 2025-01-06 RX ORDER — SODIUM CHLORIDE 9 MG/ML
30 INJECTION, SOLUTION INTRAVENOUS CONTINUOUS PRN
Status: DISCONTINUED | OUTPATIENT
Start: 2025-01-06 | End: 2025-01-06 | Stop reason: HOSPADM

## 2025-01-06 RX ORDER — PROPOFOL 10 MG/ML
VIAL (ML) INTRAVENOUS AS NEEDED
Status: DISCONTINUED | OUTPATIENT
Start: 2025-01-06 | End: 2025-01-06 | Stop reason: SURG

## 2025-01-06 RX ORDER — SIMETHICONE 40MG/0.6ML
SUSPENSION, DROPS(FINAL DOSAGE FORM)(ML) ORAL AS NEEDED
Status: DISCONTINUED | OUTPATIENT
Start: 2025-01-06 | End: 2025-01-06 | Stop reason: HOSPADM

## 2025-01-06 RX ADMIN — PROPOFOL 100 MG: 10 INJECTION, EMULSION INTRAVENOUS at 09:10

## 2025-01-06 RX ADMIN — FENTANYL CITRATE 50 MCG: 50 INJECTION, SOLUTION INTRAMUSCULAR; INTRAVENOUS at 08:47

## 2025-01-06 RX ADMIN — PROPOFOL 100 MG: 10 INJECTION, EMULSION INTRAVENOUS at 08:52

## 2025-01-06 RX ADMIN — LIDOCAINE HYDROCHLORIDE 50 MG: 20 INJECTION, SOLUTION INTRAVENOUS at 08:47

## 2025-01-06 RX ADMIN — PROPOFOL 100 MG: 10 INJECTION, EMULSION INTRAVENOUS at 08:47

## 2025-01-06 RX ADMIN — PROPOFOL 100 MG: 10 INJECTION, EMULSION INTRAVENOUS at 09:04

## 2025-01-06 RX ADMIN — PROPOFOL 100 MG: 10 INJECTION, EMULSION INTRAVENOUS at 08:56

## 2025-01-06 NOTE — ANESTHESIA POSTPROCEDURE EVALUATION
Patient: Roque Proctor    Procedure Summary       Date: 01/06/25 Room / Location: [facility] ENDOSCOPY 2 / [facility] ENDOSCOPY    Anesthesia Start: 0846 Anesthesia Stop: 0915    Procedure: Colonoscopy with polypectomy Diagnosis:       Encounter for colorectal cancer screening      (Encounter for colorectal cancer screening [Z12.11, Z12.12])    Surgeons: Silvia Olmstead MD Provider: Moisés Reed CRNA    Anesthesia Type: MAC ASA Status: 3            Anesthesia Type: MAC    Vitals  No vitals data found for the desired time range.          Post Anesthesia Care and Evaluation    Patient location during evaluation: bedside  Patient participation: complete - patient participated  Level of consciousness: awake  Pain score: 0  Pain management: adequate    Airway patency: patent  Anesthetic complications: No anesthetic complications  PONV Status: controlled  Cardiovascular status: acceptable and stable  Respiratory status: acceptable and room air  Hydration status: acceptable    Comments: Vital signs as noted in nursing documentation as per protocol

## 2025-01-06 NOTE — ANESTHESIA PREPROCEDURE EVALUATION
Anesthesia Evaluation     Patient summary reviewed and Nursing notes reviewed   NPO Solid Status: > 8 hours  NPO Liquid Status: > 8 hours           Airway   Mallampati: II  TM distance: >3 FB  Neck ROM: full  Possible difficult intubation  Dental      Pulmonary    (+) a smoker Former, Abstained day of surgery, cigarettes,  Cardiovascular - normal exam    (+) hypertension well controlled 2 medications or greater      Neuro/Psych  (+) psychiatric history Anxiety  GI/Hepatic/Renal/Endo    (+) obesity, hepatitis C, liver disease cirrhosis, diabetes mellitus type 2 well controlled    Musculoskeletal     Abdominal   (+) obese   Substance History      OB/GYN          Other                    Anesthesia Plan    ASA 3     MAC     intravenous induction     Anesthetic plan, risks, benefits, and alternatives have been provided, discussed and informed consent has been obtained with: patient.  Pre-procedure education provided    CODE STATUS:

## 2025-01-06 NOTE — DISCHARGE INSTRUCTIONS
No pushing, pulling, tugging,  heavy lifting, or strenuous activity.  No major decision making, driving, or drinking alcoholic beverages for 24 hours. ( due to the medications you have  received)  Always use good hand hygiene/washing techniques.  NO driving while taking pain medications.    * if you have an incision:  Check your incision area every day for signs of infection.   Check for:  * more redness, swelling, or pain  *more fluid or blood  *warmth  *pus or bad smell    To assist you in voiding:  Drink plenty of fluids  Listen to running water while attempting to void.    If you are unable to urinate and you have an uncomfortable urge to void or it has been   6 hours since you were discharged, return to the Emergency Room    - Discharge patient to home (ambulatory).   - High fiber diet.   - Await pathology results.   - Repeat colonoscopy in 3 years for surveillance.   - Return to GI office in 8 weeks.

## 2025-01-06 NOTE — H&P
[facility]  HISTORY AND PHYSICAL    Patient Name: Roque Proctor  : 1980  MRN: [id number]    Chief Complaint:   For colonoscopy    History Of Presenting Illness:    High risk screening colonoscopy    Father had colon CA      Past Medical History:   Diagnosis Date    Anxiety     Arthritis     Diabetes mellitus     Hepatitis C     Hypertension     Substance abuse     Visual impairment        Past Surgical History:   Procedure Laterality Date    CYST REMOVAL Right     head       Social History     Socioeconomic History    Marital status: Single   Tobacco Use    Smoking status: Former     Current packs/day: 0.00     Average packs/day: 1 pack/day for 27.0 years (27.0 ttl pk-yrs)     Types: Cigarettes     Start date:      Quit date:      Years since quittin.0     Passive exposure: Never    Smokeless tobacco: Never   Vaping Use    Vaping status: Every Day    Substances: Nicotine, Flavoring    Devices: Disposable    Passive vaping exposure: Yes   Substance and Sexual Activity    Alcohol use: Not Currently    Drug use: Not Currently     Types: Heroin, Methamphetamines     Comment: Quit using both in     Sexual activity: Yes     Partners: Female     Birth control/protection: None       Family History   Problem Relation Age of Onset    Lung cancer Mother     Diabetes Father     Colon cancer Father     Diabetes Maternal Grandmother     Diabetes Maternal Grandfather     Diabetes Paternal Grandmother     Diabetes Paternal Grandfather        Prior to Admission Medications:  Medications Prior to Admission   Medication Sig Dispense Refill Last Dose/Taking    amLODIPine (NORVASC) 10 MG tablet Take 1 tablet by mouth Daily. 90 tablet 1 2025 Morning    fenofibrate (TRICOR) 54 MG tablet Take 1 tablet by mouth Daily. 90 tablet 1 2025 Morning    Glecaprevir-Pibrentasvir (Mavyret) 100-40 MG tablet Take 3 tablets by mouth Daily with food. Hold atorvastatin 10 mg while on therapy. 84 tablet 1  1/5/2025 Morning    losartan (Cozaar) 25 MG tablet Take 1 tablet by mouth Daily. 30 tablet 2 1/5/2025 Morning    multivitamin with minerals tablet tablet Take 1 tablet by mouth Daily.   1/5/2025 Morning    Ozempic, 2 MG/DOSE, 8 MG/3ML solution pen-injector Inject 2 mg under the skin into the appropriate area as directed 1 (One) Time Per Week. 3 mL 2 12/30/2024    Testosterone Cypionate (DEPOTESTOTERONE CYPIONATE) 200 MG/ML injection Inject 1 mL into the appropriate muscle as directed by prescriber Every 14 (Fourteen) Days. 2 mL 0 Past Week    vitamin B-12 (CYANOCOBALAMIN) 1000 MCG tablet Take 1 tablet by mouth Daily.   1/5/2025 Morning    vitamin D3 125 MCG (5000 UT) capsule capsule Take 1 capsule by mouth Daily.   1/5/2025 Morning    atorvastatin (LIPITOR) 10 MG tablet Take 1 tablet by mouth Every Night. (Patient not taking: Reported on 12/20/2024)       polyethylene glycol (GoLYTELY) 236 g solution Follow instructions given at office 4000 mL 0        Allergies:  No Known Allergies     Vitals: Temp:  [97.7 °F (36.5 °C)] 97.7 °F (36.5 °C)  Heart Rate:  [81] 81  Resp:  [16] 16  BP: (145)/(103) 145/103    Review Of Systems:  Constitutional:  Negative for chills, fever, and unexpected weight change.  Respiratory:  Negative for cough, chest tightness, shortness of breath, and wheezing.  Cardiovascular:  Negative for chest pain, palpitations, and leg swelling.  Gastrointestinal:  Negative for abdominal distention, abdominal pain, nausea, vomiting.  Neurological:  Negative for weakness, numbness, and headaches.     Physical Exam:    General Appearance:  Alert, cooperative, in no acute distress.   Lungs:   Clear to auscultation, respirations regular, even and                 unlabored.   Heart:  Regular rhythm and normal rate.   Abdomen:   Normal bowel sounds, no masses, no organomegaly. Soft, nontender, nondistended   Neurologic: Alert and oriented x 3. Moves all four limbs equally       Assessment & Plan      Assessment:  Principal Problem:    Encounter for colorectal cancer screening      Plan: Colonoscopy with possible biopsy, polypectomy, ablation of arteriovenous malformations or control of bleeding  CPT CODE:  (N/A)     Silvia Olmstead MD  1/6/2025

## 2025-01-08 LAB — REF LAB TEST METHOD: NORMAL

## 2025-01-23 ENCOUNTER — OFFICE VISIT (OUTPATIENT)
Dept: FAMILY MEDICINE CLINIC | Facility: CLINIC | Age: 45
End: 2025-01-23
Payer: MEDICAID

## 2025-01-23 VITALS
OXYGEN SATURATION: 97 % | HEART RATE: 85 BPM | DIASTOLIC BLOOD PRESSURE: 92 MMHG | WEIGHT: 280 LBS | HEIGHT: 75 IN | BODY MASS INDEX: 34.82 KG/M2 | RESPIRATION RATE: 16 BRPM | SYSTOLIC BLOOD PRESSURE: 134 MMHG

## 2025-01-23 DIAGNOSIS — E11.43 TYPE 2 DIABETES MELLITUS WITH DIABETIC AUTONOMIC NEUROPATHY, WITHOUT LONG-TERM CURRENT USE OF INSULIN: Primary | ICD-10-CM

## 2025-01-23 DIAGNOSIS — I10 PRIMARY HYPERTENSION: ICD-10-CM

## 2025-01-23 DIAGNOSIS — E29.1 HYPOGONADISM IN MALE: ICD-10-CM

## 2025-01-23 DIAGNOSIS — E78.5 DYSLIPIDEMIA: ICD-10-CM

## 2025-01-23 PROCEDURE — 99214 OFFICE O/P EST MOD 30 MIN: CPT | Performed by: NURSE PRACTITIONER

## 2025-01-23 RX ORDER — FENOFIBRATE 54 MG/1
54 TABLET ORAL DAILY
Qty: 90 TABLET | Refills: 1 | Status: SHIPPED | OUTPATIENT
Start: 2025-01-23

## 2025-01-23 RX ORDER — GLIPIZIDE 2.5 MG/1
2.5 TABLET, EXTENDED RELEASE ORAL DAILY
Qty: 90 TABLET | Refills: 1 | Status: SHIPPED | OUTPATIENT
Start: 2025-01-23

## 2025-01-23 RX ORDER — LOSARTAN POTASSIUM 25 MG/1
25 TABLET ORAL DAILY
Qty: 90 TABLET | Refills: 1 | Status: SHIPPED | OUTPATIENT
Start: 2025-01-23

## 2025-01-23 RX ORDER — AMLODIPINE BESYLATE 10 MG/1
10 TABLET ORAL DAILY
Qty: 90 TABLET | Refills: 1 | Status: SHIPPED | OUTPATIENT
Start: 2025-01-23

## 2025-01-23 RX ORDER — TESTOSTERONE CYPIONATE 200 MG/ML
200 INJECTION, SOLUTION INTRAMUSCULAR
Qty: 2 ML | Refills: 0 | Status: SHIPPED | OUTPATIENT
Start: 2025-01-23

## 2025-01-23 RX ORDER — SEMAGLUTIDE 2.68 MG/ML
2 INJECTION, SOLUTION SUBCUTANEOUS WEEKLY
Qty: 3 ML | Refills: 2 | Status: SHIPPED | OUTPATIENT
Start: 2025-01-23

## 2025-01-23 RX ORDER — ATORVASTATIN CALCIUM 10 MG/1
10 TABLET, FILM COATED ORAL NIGHTLY
Qty: 90 TABLET | Refills: 1 | Status: SHIPPED | OUTPATIENT
Start: 2025-01-23

## 2025-01-23 NOTE — PROGRESS NOTES
"                      Established Patient        Chief Complaint:   Chief Complaint   Patient presents with    Fatigue     Pt is here for a 2 month follow up          History of Present Illness:    Roque Proctor is a 44 y.o. male who presents today for follow up of HTN, type 2 DM, HLD, hypogonadism.    HTN  Losartan 25mg daily  Amlodipine 10mg daily  Has not been monitoring BP at home  Takes medications when he wakes up in the morning  BP elevated in office today--repeat 134/92   Denies chest pain, SOA, palpitations, severe HA, confusion, visual disturbance, dizziness.     T2DM  Ozempic 2mg weekly  Tolerating well  Most recent A1c 7.5%    HLD  Atorvastatin 10mg nightly--has not taken, held during Mavyret therapy, has since finished--ok to restart  Fenofibrate 54 daily    Hypogonadism  Has been unable to get his testosterone due to pharmacy issues.     Denies SI/HI. Denies any aspiration/dysphagia. Balanced dietary intake, good hydration habits. Denies fever, chills, night sweats. Reports adequate UOP without hematuria. Denies BRB/BTS.    Subjective     The following portions of the patient's history were reviewed and updated as appropriate: allergies, current medications, past family history, past medical history, past social history, past surgical history and problem list.    ALLERGIES  No Known Allergies    Review of Systems  Gen- No fevers, chills  HEENT--No runny nose, congestion, sore throat, ear pain  CV- No chest pain, palpitations  Resp- No cough, dyspnea  GI- No N/V/D, abd pain  -No dysuria, flank pain, difficulty urinating  Musc-No tenderness, swelling, decreased ROM  Neuro-No dizziness, headaches  Psych-No SI/HI, sleep disturbance    Objective     Vital Signs:   /92   Pulse 85   Resp 16   Ht 190.5 cm (75\")   Wt 127 kg (280 lb)   SpO2 97%   BMI 35.00 kg/m²           Physical Exam   Physical Exam  Vitals and nursing note reviewed.   HENT:      Mouth/Throat:      Lips: Pink.   Eyes:      " General: Lids are normal.   Cardiovascular:      Rate and Rhythm: Normal rate and regular rhythm.   Pulmonary:      Effort: Pulmonary effort is normal.      Breath sounds: Normal breath sounds.   Neurological:      Mental Status: He is alert and oriented to person, place, and time.      Gait: Gait is intact.   Psychiatric:         Attention and Perception: Attention normal.         Mood and Affect: Mood and affect normal.         Speech: Speech normal.         Behavior: Behavior normal. Behavior is cooperative.         Assessment and Plan      Assessment/Plan:   Diagnoses and all orders for this visit:    1. Type 2 diabetes mellitus with diabetic autonomic neuropathy, without long-term current use of insulin (Primary)  -     Ozempic, 2 MG/DOSE, 8 MG/3ML solution pen-injector; Inject 2 mg under the skin into the appropriate area as directed 1 (One) Time Per Week.  Dispense: 3 mL; Refill: 2  -     atorvastatin (LIPITOR) 10 MG tablet; Take 1 tablet by mouth Every Night.  Dispense: 90 tablet; Refill: 1  -     glipizide (GLUCOTROL XL) 2.5 MG 24 hr tablet; Take 1 tablet by mouth Daily.  Dispense: 90 tablet; Refill: 1    2. Hypogonadism in male  -     Testosterone Cypionate (DEPOTESTOTERONE CYPIONATE) 200 MG/ML injection; Inject 1 mL into the appropriate muscle as directed by prescriber Every 14 (Fourteen) Days.  Dispense: 2 mL; Refill: 0    3. Dyslipidemia  -     fenofibrate (TRICOR) 54 MG tablet; Take 1 tablet by mouth Daily.  Dispense: 90 tablet; Refill: 1  -     atorvastatin (LIPITOR) 10 MG tablet; Take 1 tablet by mouth Every Night.  Dispense: 90 tablet; Refill: 1    4. Primary hypertension  -     losartan (Cozaar) 25 MG tablet; Take 1 tablet by mouth Daily.  Dispense: 90 tablet; Refill: 1  -     amLODIPine (NORVASC) 10 MG tablet; Take 1 tablet by mouth Daily.  Dispense: 90 tablet; Refill: 1    Risks, benefits, and potential side effects of current/new medications reviewed with patient.  Patient voiced understanding  and wished to proceed with treatment.    Patient was encouraged to keep me informed of any acute changes, lack of improvement, or any new concerning symptoms.    Discussed need for reduction in sodium/salt/caffeine intake; improve sleep habits as able; inc formal CV exercise program with goal of vigorous activity most, if not all, days of the week.     Ambulatory blood pressure monitoring encouraged prior to taking medication daily and as needed.    Contact office for symptomatic HTN or consistently uncontrolled HTN.     Patient to increase dietary intake of vegetables, fruits, nuts, whole grains and fish. Decrease dietary intake of carbs, processed foods such as lunch meats, saturated fats, sugary beverages.     Increase exercise regimen as tolerated toward a goal of 120-150 minutes/week.     Patient to present to ED for chest pain, confusion, vision disturbance.     Encouraged ambulatory glucose monitoring. Patient to call office or RTC for glucose levels consistently greater than 300 or poorly controlled with current medication regimen.     Patient was encouraged to keep me informed of any acute changes, lack of improvement, or any new concerning symptoms.      Discussion Summary:  Discussed plan of care in detail with pt today; pt verb understanding and agrees.        I have reviewed and updated all copied forward information, as appropriate.  I attest to the accuracy and relevance of any unchanged information.      Follow up:  No follow-ups on file.     Patient Education:  There are no Patient Instructions on file for this visit.    KAYLIE Muñoz  02/11/25  12:29 EST          Please note that portions of this note may have been completed with a voice recognition program.

## 2025-02-24 DIAGNOSIS — E29.1 HYPOGONADISM IN MALE: ICD-10-CM

## 2025-02-24 DIAGNOSIS — E11.43 TYPE 2 DIABETES MELLITUS WITH DIABETIC AUTONOMIC NEUROPATHY, WITHOUT LONG-TERM CURRENT USE OF INSULIN: ICD-10-CM

## 2025-02-24 NOTE — TELEPHONE ENCOUNTER
Caller: Esthela Roque ZAHEER    Relationship: Self    Best call back number: [phone]     Requested Prescriptions:   Requested Prescriptions     Pending Prescriptions Disp Refills    Testosterone Cypionate (DEPOTESTOTERONE CYPIONATE) 200 MG/ML injection 2 mL 0     Sig: Inject 1 mL into the appropriate muscle as directed by prescriber Every 14 (Fourteen) Days.    Ozempic, 2 MG/DOSE, 8 MG/3ML solution pen-injector 3 mL 2     Sig: Inject 2 mg under the skin into the appropriate area as directed 1 (One) Time Per Week.        Pharmacy where request should be sent: [id number] - [place], [place] - [address] - [phone]  - [phone] FX     Last office visit with prescribing clinician: 1/23/2025   Last telemedicine visit with prescribing clinician: Visit date not found   Next office visit with prescribing clinician: Visit date not found     Additional details provided by patient:     Does the patient have less than a 3 day supply:  [] Yes  [x] No    Would you like a call back once the refill request has been completed: [] Yes [x] No    If the office needs to give you a call back, can they leave a voicemail: [] Yes [x] No    Stephanie Meier Rep   02/24/25 09:03 EST

## 2025-02-25 RX ORDER — SEMAGLUTIDE 2.68 MG/ML
2 INJECTION, SOLUTION SUBCUTANEOUS WEEKLY
Qty: 3 ML | Refills: 2 | Status: SHIPPED | OUTPATIENT
Start: 2025-02-25

## 2025-02-25 NOTE — TELEPHONE ENCOUNTER
Rx Refill Note  Requested Prescriptions     Pending Prescriptions Disp Refills    Testosterone Cypionate (DEPOTESTOTERONE CYPIONATE) 200 MG/ML injection 2 mL 0     Sig: Inject 1 mL into the appropriate muscle as directed by prescriber Every 14 (Fourteen) Days.     Signed Prescriptions Disp Refills    Ozempic, 2 MG/DOSE, 8 MG/3ML solution pen-injector 3 mL 2     Sig: Inject 2 mg under the skin into the appropriate area as directed 1 (One) Time Per Week.     Authorizing Provider: MAGALI LAWRENCE     Ordering User: DIANELYS GREENWOOD      Last office visit with prescribing clinician: 1/23/2025   Last telemedicine visit with prescribing clinician: Visit date not found   Next office visit with prescribing clinician: Visit date not found                         Would you like a call back once the refill request has been completed: [] Yes [] No    If the office needs to give you a call back, can they leave a voicemail: [] Yes [] No    Dianelys Greenwood MA  02/25/25, 09:09 EST

## 2025-02-26 RX ORDER — TESTOSTERONE CYPIONATE 200 MG/ML
200 INJECTION, SOLUTION INTRAMUSCULAR
Qty: 2 ML | Refills: 0 | Status: SHIPPED | OUTPATIENT
Start: 2025-02-26

## 2025-02-26 RX ORDER — TESTOSTERONE CYPIONATE 200 MG/ML
200 INJECTION, SOLUTION INTRAMUSCULAR
Qty: 2 ML | Refills: 0 | Status: SHIPPED | OUTPATIENT
Start: 2025-02-26 | End: 2025-02-26

## 2025-02-28 ENCOUNTER — TELEPHONE (OUTPATIENT)
Dept: PHARMACY | Facility: HOSPITAL | Age: 45
End: 2025-02-28
Payer: MEDICAID

## 2025-02-28 NOTE — TELEPHONE ENCOUNTER
Called Mr. Proctor to remind him to get his hepatitis C labs repeated after finishing Mavyret therapy 1/20/25. Spoke with his wife Briana and she stated that she would let him know. Lab orders remain active.

## 2025-03-17 ENCOUNTER — OFFICE VISIT (OUTPATIENT)
Dept: GASTROENTEROLOGY | Facility: CLINIC | Age: 45
End: 2025-03-17
Payer: MEDICAID

## 2025-03-17 VITALS
DIASTOLIC BLOOD PRESSURE: 82 MMHG | SYSTOLIC BLOOD PRESSURE: 128 MMHG | HEART RATE: 90 BPM | OXYGEN SATURATION: 98 % | WEIGHT: 286 LBS | BODY MASS INDEX: 35.75 KG/M2

## 2025-03-17 DIAGNOSIS — D12.6 SERRATED ADENOMA OF COLON: ICD-10-CM

## 2025-03-17 DIAGNOSIS — Z80.0 FAMILY HISTORY OF COLON CANCER: ICD-10-CM

## 2025-03-17 DIAGNOSIS — B18.2 CHRONIC HEPATITIS C WITHOUT HEPATIC COMA: Primary | ICD-10-CM

## 2025-03-17 PROCEDURE — 1160F RVW MEDS BY RX/DR IN RCRD: CPT | Performed by: PHYSICIAN ASSISTANT

## 2025-03-17 PROCEDURE — 99214 OFFICE O/P EST MOD 30 MIN: CPT | Performed by: PHYSICIAN ASSISTANT

## 2025-03-17 PROCEDURE — 1159F MED LIST DOCD IN RCRD: CPT | Performed by: PHYSICIAN ASSISTANT

## 2025-03-17 NOTE — PROGRESS NOTES
Follow Up Note     Date: 2025   Patient Name: Roque Proctor  MRN: [id number]  : 1980     Primary Care Provider: Lizzy Reyes APRN     Chief Complaint   Patient presents with    Results     Colonoscopy      History of present illness:   3/17/2025  Roque Proctor is a 45 y.o. male who is here today for follow up regarding Results (Colonoscopy) and Hep C.    Had colonoscopy since last visit, would like to discuss results. Had colonoscopy for screening due to age. No current GI complaints.     Finished Mavyret approx 2025 which he took 3 tabs PO once daily x 8 weeks. No illicit drug use.    Interval History:  2024  He found out about having Hepatitis C infection approx 2 weeks ago. He has not had prior treatment for hepatitis. Reports no known personal history of liver disease including other viral hepatitis. There is no known family history of liver disease or cirrhosis. He admits to previous IVDU and intranasal drug use. Has been clean now for 3 years. He does have nonprofessional tattoos. Denies having previous alcoholism. He does not currently drink alcohol. He denies  current illicit drug use including marijuana. No recent liver imaging. He has had recent labs. He has not had previous vaccinations for Hepatitis A and B. Has been incarcerated in the past. He is currently working full time.       He has not had previous colonoscopy. Father had colon cancer, late 50s. No constipation or diarrhea. No rectal bleeding. No abdominal pain.      On ozempic. No current nausea or vomiting. No significant reflux symptoms.     Subjective     Past Medical History:   Diagnosis Date    Anxiety     Arthritis     Diabetes mellitus     Hepatitis C     Hypertension     Substance abuse     Visual impairment      Past Surgical History:   Procedure Laterality Date    COLONOSCOPY N/A 2025    Procedure: Colonoscopy with polypectomy;  Surgeon: Silvia Olmstead MD;  Location: [facility] ENDOSCOPY;   Service: Gastroenterology;  Laterality: N/A;    CYST REMOVAL Right     head     Family History   Problem Relation Age of Onset    Lung cancer Mother     Diabetes Father     Colon cancer Father     Diabetes Maternal Grandmother     Diabetes Maternal Grandfather     Diabetes Paternal Grandmother     Diabetes Paternal Grandfather      Social History     Socioeconomic History    Marital status: Single   Tobacco Use    Smoking status: Former     Current packs/day: 0.00     Average packs/day: 1 pack/day for 27.0 years (27.0 ttl pk-yrs)     Types: Cigarettes     Start date:      Quit date:      Years since quittin.2     Passive exposure: Never    Smokeless tobacco: Never   Vaping Use    Vaping status: Every Day    Substances: Nicotine, Flavoring    Devices: Disposable    Passive vaping exposure: Yes   Substance and Sexual Activity    Alcohol use: Not Currently    Drug use: Not Currently     Types: Heroin, Methamphetamines     Comment: Quit using both in     Sexual activity: Yes     Partners: Female     Birth control/protection: None     Current Outpatient Medications:     amLODIPine (NORVASC) 10 MG tablet, Take 1 tablet by mouth Daily., Disp: 90 tablet, Rfl: 1    atorvastatin (LIPITOR) 10 MG tablet, Take 1 tablet by mouth Every Night., Disp: 90 tablet, Rfl: 1    fenofibrate (TRICOR) 54 MG tablet, Take 1 tablet by mouth Daily., Disp: 90 tablet, Rfl: 1    glipizide (GLUCOTROL XL) 2.5 MG 24 hr tablet, Take 1 tablet by mouth Daily., Disp: 90 tablet, Rfl: 1    losartan (Cozaar) 25 MG tablet, Take 1 tablet by mouth Daily., Disp: 90 tablet, Rfl: 1    multivitamin with minerals tablet tablet, Take 1 tablet by mouth Daily., Disp: , Rfl:     Ozempic, 2 MG/DOSE, 8 MG/3ML solution pen-injector, Inject 2 mg under the skin into the appropriate area as directed 1 (One) Time Per Week., Disp: 3 mL, Rfl: 2    Testosterone Cypionate (DEPOTESTOTERONE CYPIONATE) 200 MG/ML injection, Inject 1 mL into the appropriate muscle as  directed by prescriber Every 14 (Fourteen) Days., Disp: 2 mL, Rfl: 0    vitamin B-12 (CYANOCOBALAMIN) 1000 MCG tablet, Take 1 tablet by mouth Daily., Disp: , Rfl:     vitamin D3 125 MCG (5000 UT) capsule capsule, Take 1 capsule by mouth Daily., Disp: , Rfl:     No Known Allergies    The following portions of the patient's history were reviewed and updated as appropriate: allergies, current medications, past family history, past medical history, past social history, past surgical history and problem list.    Objective     Physical Exam  Constitutional:       General: He is not in acute distress.     Appearance: Normal appearance. He is well-developed. He is not diaphoretic.   HENT:      Head: Normocephalic and atraumatic.      Right Ear: External ear normal.      Left Ear: External ear normal.      Nose: Nose normal.   Eyes:      General: No scleral icterus.        Right eye: No discharge.         Left eye: No discharge.      Conjunctiva/sclera: Conjunctivae normal.   Neck:      Trachea: No tracheal deviation.   Pulmonary:      Effort: Pulmonary effort is normal. No respiratory distress.   Musculoskeletal:         General: Normal range of motion.      Cervical back: Normal range of motion.   Skin:     Coloration: Skin is not pale.      Findings: No erythema or rash.   Neurological:      Mental Status: He is alert and oriented to person, place, and time.      Coordination: Coordination normal.   Psychiatric:         Mood and Affect: Mood normal.         Behavior: Behavior normal.         Thought Content: Thought content normal.         Judgment: Judgment normal.       Vitals:    03/17/25 0939   BP: 128/82   Pulse: 90   SpO2: 98%   Weight: 130 kg (286 lb)     Results Review:   I reviewed the patient's new clinical results.    Lab on 12/20/2024   Component Date Value Ref Range Status    Glucose 12/20/2024 131 (H)  65 - 99 mg/dL Final    BUN 12/20/2024 10  6 - 20 mg/dL Final    Creatinine 12/20/2024 0.93  0.76 - 1.27  mg/dL Final    Sodium 12/20/2024 140  136 - 145 mmol/L Final    Potassium 12/20/2024 3.8  3.5 - 5.2 mmol/L Final    Chloride 12/20/2024 103  98 - 107 mmol/L Final    CO2 12/20/2024 26.0  22.0 - 29.0 mmol/L Final    Calcium 12/20/2024 9.3  8.6 - 10.5 mg/dL Final    Total Protein 12/20/2024 7.8  6.0 - 8.5 g/dL Final    Albumin 12/20/2024 4.1  3.5 - 5.2 g/dL Final    ALT (SGPT) 12/20/2024 20  1 - 41 U/L Final    AST (SGOT) 12/20/2024 18  1 - 40 U/L Final    Alkaline Phosphatase 12/20/2024 96  39 - 117 U/L Final    Total Bilirubin 12/20/2024 0.6  0.0 - 1.2 mg/dL Final    Globulin 12/20/2024 3.7  gm/dL Final    A/G Ratio 12/20/2024 1.1  g/dL Final    BUN/Creatinine Ratio 12/20/2024 10.8  7.0 - 25.0 Final    Anion Gap 12/20/2024 11.0  5.0 - 15.0 mmol/L Final    eGFR 12/20/2024 102.6  >60.0 mL/min/1.73 Final    Hepatitis C Quantitation 12/20/2024 HCV Not Detected  IU/mL Final    Test Information 12/20/2024 Comment   Final    The quantitative range of this assay is 15 IU/mL to 100 million IU/mL.      Colonoscopy 1/6/2025  - One 8 to 10 mm polyp in the proximal transverse colon, removed with a cold snare. Resected and retrieved. - One 5 mm polyp in the proximal transverse colon, removed with a cold snare. Resected and retrieved. - A Small lipoma in the proximal ascending colon. - Non-bleeding external and internal hemorrhoids. - The examined portion of the ileum was normal.  Pathology DIAGNOSIS:  TRANSVERSE COLON POLYP, X2:  Serrated polyp with features suggestive of sessile serrated adenoma  Hyperplastic polyps  Negative for cytologic atypia or malignancy     Assessment / Plan      1. Chronic hepatitis C without hepatic coma  He has chronic Hepatitis C infection, genotype unknown, treatment naive, without cirrhosis (F0-1). He took Mavyret 3 tabs PO once daily for  8 weeks for Hepatitis C therapy.  Hep C viral load lab (HCV RNA quant) and CMP was checked approx 4 weeks after start of therapy, 12/2024 HCV RNA not detected. He  did not have labs at end of therapy. Will have final labs 3 months s/p therapy to determine response to treatment and cure, due soon. He will continue to abstain from alcohol use at this time and agrees not to use illegal drugs. He understands to avoid any high risk behavior to prevent any reinfection after treatment. Had hepatocellular pattern elevated liver enzymes prior to start of treatment but liver enzymes normal 12/2024.     He is immune to hepatitis B but not A, vaccination series recommended  12 week SVR labs due 4/14/2025     - Hepatitis C RNA, Quantitative, PCR (graph); Future  - Comprehensive Metabolic Panel; Future     2. Serrated adenoma of colon  Colonoscopy 1/2025 showed 2 colon polyps, 1 was serrated adenoma (8-10 mm), other was hyperplastic. This was his first colonoscopy. Father had colon cancer, late 50s.      Repeat colonoscopy in 3 years for surveillance, due 1/2028      Follow Up:   Return if symptoms worsen or fail to improve. He prefers to call back for an appt if needed. He will be notified of lab results after review.     America Villegas PA-C  [facility]  3/17/2025  17:30 EDT

## 2025-03-25 DIAGNOSIS — E11.43 TYPE 2 DIABETES MELLITUS WITH DIABETIC AUTONOMIC NEUROPATHY, WITHOUT LONG-TERM CURRENT USE OF INSULIN: ICD-10-CM

## 2025-03-25 DIAGNOSIS — E29.1 HYPOGONADISM IN MALE: ICD-10-CM

## 2025-03-25 RX ORDER — TESTOSTERONE CYPIONATE 200 MG/ML
200 INJECTION, SOLUTION INTRAMUSCULAR
Qty: 2 ML | Refills: 0 | OUTPATIENT
Start: 2025-03-25

## 2025-03-25 RX ORDER — SEMAGLUTIDE 2.68 MG/ML
2 INJECTION, SOLUTION SUBCUTANEOUS WEEKLY
Qty: 3 ML | Refills: 2 | Status: SHIPPED | OUTPATIENT
Start: 2025-03-25

## 2025-03-25 NOTE — TELEPHONE ENCOUNTER
Caller: EsthelaRoque    Relationship: Self    Best call back number: [phone]     Requested Prescriptions:   Requested Prescriptions     Pending Prescriptions Disp Refills    Ozempic, 2 MG/DOSE, 8 MG/3ML solution pen-injector 3 mL 2     Sig: Inject 2 mg under the skin into the appropriate area as directed 1 (One) Time Per Week.    Testosterone Cypionate (DEPOTESTOTERONE CYPIONATE) 200 MG/ML injection 2 mL 0     Sig: Inject 1 mL into the appropriate muscle as directed by prescriber Every 14 (Fourteen) Days.        Pharmacy where request should be sent: [facility] #[id number] - [place], [place] - [address] [facility] [facility] [phone]  - [phone] FX     Last office visit with prescribing clinician: 1/23/2025   Last telemedicine visit with prescribing clinician: Visit date not found   Next office visit with prescribing clinician: Visit date not found     Does the patient have less than a 3 day supply:  [x] Yes  [] No    Would you like a call back once the refill request has been completed: [] Yes [x] No    If the office needs to give you a call back, can they leave a voicemail: [] Yes [x] No    Stephanie Herrera Rep   03/25/25 15:26 EDT

## 2025-03-25 NOTE — TELEPHONE ENCOUNTER
Rx Refill Note  Requested Prescriptions     Pending Prescriptions Disp Refills    Testosterone Cypionate (DEPOTESTOTERONE CYPIONATE) 200 MG/ML injection 2 mL 0     Sig: Inject 1 mL into the appropriate muscle as directed by prescriber Every 14 (Fourteen) Days.     Signed Prescriptions Disp Refills    Ozempic, 2 MG/DOSE, 8 MG/3ML solution pen-injector 3 mL 2     Sig: Inject 2 mg under the skin into the appropriate area as directed 1 (One) Time Per Week.     Authorizing Provider: MAGALI LAWRENCE     Ordering User: DIANELYS GREENWOOD      Last office visit with prescribing clinician: 1/23/2025   Last telemedicine visit with prescribing clinician: Visit date not found   Next office visit with prescribing clinician: Visit date not found                         Would you like a call back once the refill request has been completed: [] Yes [] No    If the office needs to give you a call back, can they leave a voicemail: [] Yes [] No    Dianelys Greenwood MA  03/25/25, 16:34 EDT

## 2025-03-27 DIAGNOSIS — E29.1 HYPOGONADISM IN MALE: ICD-10-CM

## 2025-03-27 RX ORDER — TESTOSTERONE CYPIONATE 200 MG/ML
INJECTION, SOLUTION INTRAMUSCULAR
Qty: 2 ML | OUTPATIENT
Start: 2025-03-27

## 2025-03-27 NOTE — TELEPHONE ENCOUNTER
Pt ambulated, using cane, with stand by assist, pt tolerated well. PIV discontinued. Pt received discharge paper work, 3 month follow up appt has been made for pt, reminder card given. Right and left flank site remain CDI.     1725 Pt transported to vehicle via wheelchair. Pt's spouse has arrived and providing ride home.    Rx Refill Note  Requested Prescriptions     Pending Prescriptions Disp Refills    Testosterone Cypionate (DEPOTESTOTERONE CYPIONATE) 200 MG/ML injection [Pharmacy Med Name: TESTOSTERONE CYP 200MG/ML SDV 1ML] 2 mL      Sig: ADMINISTER 1 ML IN THE MUSCLE EVERY 14 DAYS AS DIRECTED BY PRESCRIBER      Last office visit with prescribing clinician: 1/23/2025   Last telemedicine visit with prescribing clinician: Visit date not found   Next office visit with prescribing clinician: Visit date not found                         Would you like a call back once the refill request has been completed: [] Yes [] No    If the office needs to give you a call back, can they leave a voicemail: [] Yes [] No    Sarah Alexandra MA  03/27/25, 15:45 EDT

## 2025-03-28 ENCOUNTER — CLINICAL SUPPORT (OUTPATIENT)
Dept: FAMILY MEDICINE CLINIC | Facility: CLINIC | Age: 45
End: 2025-03-28
Payer: MEDICAID

## 2025-03-28 ENCOUNTER — TELEPHONE (OUTPATIENT)
Dept: FAMILY MEDICINE CLINIC | Facility: CLINIC | Age: 45
End: 2025-03-28
Payer: MEDICAID

## 2025-03-28 DIAGNOSIS — Z51.81 ENCOUNTER FOR THERAPEUTIC DRUG MONITORING: ICD-10-CM

## 2025-03-28 DIAGNOSIS — E29.1 HYPOGONADISM IN MALE: ICD-10-CM

## 2025-03-28 DIAGNOSIS — Z79.890 LONG-TERM CURRENT USE OF TESTOSTERONE REPLACEMENT THERAPY: Primary | ICD-10-CM

## 2025-03-28 PROBLEM — Z12.11 ENCOUNTER FOR COLORECTAL CANCER SCREENING: Status: RESOLVED | Noted: 2024-11-15 | Resolved: 2025-03-28

## 2025-03-28 PROBLEM — D12.6 SERRATED ADENOMA OF COLON: Status: ACTIVE | Noted: 2025-03-28

## 2025-03-28 PROBLEM — Z80.0 FAMILY HISTORY OF COLON CANCER: Status: ACTIVE | Noted: 2025-03-28

## 2025-03-28 PROBLEM — Z12.12 ENCOUNTER FOR COLORECTAL CANCER SCREENING: Status: RESOLVED | Noted: 2024-11-15 | Resolved: 2025-03-28

## 2025-03-31 DIAGNOSIS — E29.1 HYPOGONADISM IN MALE: ICD-10-CM

## 2025-03-31 NOTE — TELEPHONE ENCOUNTER
Rx Refill Note  Requested Prescriptions     Pending Prescriptions Disp Refills    Testosterone Cypionate (DEPOTESTOTERONE CYPIONATE) 200 MG/ML injection 2 mL 0     Sig: Inject 1 mL into the appropriate muscle as directed by prescriber Every 14 (Fourteen) Days.      Last office visit with prescribing clinician: 1/23/2025   Last telemedicine visit with prescribing clinician: Visit date not found   Next office visit with prescribing clinician: Visit date not found                         Would you like a call back once the refill request has been completed: [] Yes [] No    If the office needs to give you a call back, can they leave a voicemail: [] Yes [] No    Tatum Juarez MA  03/31/25, 07:55 EDT

## 2025-04-01 RX ORDER — TESTOSTERONE CYPIONATE 200 MG/ML
200 INJECTION, SOLUTION INTRAMUSCULAR
Qty: 2 ML | Refills: 0 | Status: SHIPPED | OUTPATIENT
Start: 2025-04-01

## 2025-04-01 RX ORDER — TESTOSTERONE CYPIONATE 200 MG/ML
200 INJECTION, SOLUTION INTRAMUSCULAR
Qty: 2 ML | Refills: 0 | OUTPATIENT
Start: 2025-04-01

## 2025-04-01 NOTE — TELEPHONE ENCOUNTER
Rx Refill Note  Requested Prescriptions     Pending Prescriptions Disp Refills    Testosterone Cypionate (DEPOTESTOTERONE CYPIONATE) 200 MG/ML injection 2 mL 0     Sig: Inject 1 mL into the appropriate muscle as directed by prescriber Every 14 (Fourteen) Days.      Last office visit with prescribing clinician: 1/23/2025   Last telemedicine visit with prescribing clinician: Visit date not found   Next office visit with prescribing clinician: Visit date not found                         Would you like a call back once the refill request has been completed: [] Yes [] No    If the office needs to give you a call back, can they leave a voicemail: [] Yes [] No    Karen Ahn, BARRON  04/01/25, 10:35 EDT

## 2025-04-02 LAB — DRUGS UR: NORMAL

## 2025-04-25 ENCOUNTER — LAB (OUTPATIENT)
Dept: LAB | Facility: HOSPITAL | Age: 45
End: 2025-04-25
Payer: MEDICAID

## 2025-04-25 DIAGNOSIS — B18.2 CHRONIC HEPATITIS C WITHOUT HEPATIC COMA: ICD-10-CM

## 2025-04-25 LAB
ALBUMIN SERPL-MCNC: 4.3 G/DL (ref 3.5–5.2)
ALBUMIN/GLOB SERPL: 1.3 G/DL
ALP SERPL-CCNC: 72 U/L (ref 39–117)
ALT SERPL W P-5'-P-CCNC: 24 U/L (ref 1–41)
ANION GAP SERPL CALCULATED.3IONS-SCNC: 11.6 MMOL/L (ref 5–15)
AST SERPL-CCNC: 23 U/L (ref 1–40)
BILIRUB SERPL-MCNC: 0.6 MG/DL (ref 0–1.2)
BUN SERPL-MCNC: 11 MG/DL (ref 6–20)
BUN/CREAT SERPL: 10.8 (ref 7–25)
CALCIUM SPEC-SCNC: 9.3 MG/DL (ref 8.6–10.5)
CHLORIDE SERPL-SCNC: 103 MMOL/L (ref 98–107)
CO2 SERPL-SCNC: 24.4 MMOL/L (ref 22–29)
CREAT SERPL-MCNC: 1.02 MG/DL (ref 0.76–1.27)
EGFRCR SERPLBLD CKD-EPI 2021: 91.2 ML/MIN/1.73
GLOBULIN UR ELPH-MCNC: 3.2 GM/DL
GLUCOSE SERPL-MCNC: 148 MG/DL (ref 65–99)
POTASSIUM SERPL-SCNC: 4.1 MMOL/L (ref 3.5–5.2)
PROT SERPL-MCNC: 7.5 G/DL (ref 6–8.5)
SODIUM SERPL-SCNC: 139 MMOL/L (ref 136–145)

## 2025-04-25 PROCEDURE — 87522 HEPATITIS C REVRS TRNSCRPJ: CPT

## 2025-04-25 PROCEDURE — 36415 COLL VENOUS BLD VENIPUNCTURE: CPT

## 2025-04-25 PROCEDURE — 80053 COMPREHEN METABOLIC PANEL: CPT

## 2025-04-27 DIAGNOSIS — E11.43 TYPE 2 DIABETES MELLITUS WITH DIABETIC AUTONOMIC NEUROPATHY, WITHOUT LONG-TERM CURRENT USE OF INSULIN: ICD-10-CM

## 2025-04-27 DIAGNOSIS — E29.1 HYPOGONADISM IN MALE: ICD-10-CM

## 2025-04-27 DIAGNOSIS — I10 PRIMARY HYPERTENSION: ICD-10-CM

## 2025-04-28 LAB
HCV RNA SERPL NAA+PROBE-ACNC: NORMAL IU/ML
REF LAB TEST REF RANGE: NORMAL

## 2025-04-28 RX ORDER — AMLODIPINE BESYLATE 10 MG/1
10 TABLET ORAL DAILY
Qty: 90 TABLET | Refills: 1 | Status: SHIPPED | OUTPATIENT
Start: 2025-04-28

## 2025-04-28 RX ORDER — LOSARTAN POTASSIUM 25 MG/1
25 TABLET ORAL DAILY
Qty: 90 TABLET | Refills: 1 | Status: SHIPPED | OUTPATIENT
Start: 2025-04-28

## 2025-04-28 RX ORDER — SEMAGLUTIDE 2.68 MG/ML
2 INJECTION, SOLUTION SUBCUTANEOUS WEEKLY
Qty: 3 ML | Refills: 2 | Status: SHIPPED | OUTPATIENT
Start: 2025-04-28

## 2025-04-28 RX ORDER — GLIPIZIDE 2.5 MG/1
2.5 TABLET, EXTENDED RELEASE ORAL DAILY
Qty: 90 TABLET | Refills: 1 | Status: SHIPPED | OUTPATIENT
Start: 2025-04-28

## 2025-04-28 NOTE — TELEPHONE ENCOUNTER
Rx Refill Note  Requested Prescriptions     Pending Prescriptions Disp Refills    Testosterone Cypionate (DEPOTESTOTERONE CYPIONATE) 200 MG/ML injection 2 mL 0     Sig: Inject 1 mL into the appropriate muscle as directed by prescriber Every 14 (Fourteen) Days.     Signed Prescriptions Disp Refills    losartan (Cozaar) 25 MG tablet 90 tablet 1     Sig: Take 1 tablet by mouth Daily.     Authorizing Provider: MAGALI LAWRENCE     Ordering User: KAREN YANCEY    amLODIPine (NORVASC) 10 MG tablet 90 tablet 1     Sig: Take 1 tablet by mouth Daily.     Authorizing Provider: MAGALI LAWRENCE     Ordering User: KAREN YANCEY    glipizide (GLUCOTROL XL) 2.5 MG 24 hr tablet 90 tablet 1     Sig: Take 1 tablet by mouth Daily.     Authorizing Provider: MAGALI LAWRENCE     Ordering User: KAREN YANCEY    Ozempic, 2 MG/DOSE, 8 MG/3ML solution pen-injector 3 mL 2     Sig: Inject 2 mg under the skin into the appropriate area as directed 1 (One) Time Per Week.     Authorizing Provider: MAGALI LAWRENCE     Ordering User: KAREN YANCEY      Last office visit with prescribing clinician: 1/23/2025   Last telemedicine visit with prescribing clinician: Visit date not found   Next office visit with prescribing clinician: Visit date not found                         Would you like a call back once the refill request has been completed: [] Yes [] No    If the office needs to give you a call back, can they leave a voicemail: [] Yes [] No    Karen Ahn CMA  04/28/25, 13:13 EDT

## 2025-04-29 RX ORDER — TESTOSTERONE CYPIONATE 200 MG/ML
200 INJECTION, SOLUTION INTRAMUSCULAR
Qty: 2 ML | Refills: 0 | OUTPATIENT
Start: 2025-04-29

## 2025-04-30 ENCOUNTER — RESULTS FOLLOW-UP (OUTPATIENT)
Dept: GASTROENTEROLOGY | Facility: CLINIC | Age: 45
End: 2025-04-30
Payer: MEDICAID

## 2025-04-30 PROBLEM — B18.2 CHRONIC HEPATITIS C: Status: RESOLVED | Noted: 2024-11-22 | Resolved: 2025-04-30

## 2025-04-30 PROBLEM — Z86.19 HISTORY OF HEPATITIS C VIRUS INFECTION: Status: ACTIVE | Noted: 2025-04-30

## 2025-04-30 NOTE — TELEPHONE ENCOUNTER
Please let patient know that HCV RNA not detected approximately 3 months after Mavyret treatment.  Hep C is cured.  He is not immune to contract hepatitis C again and should avoid any risky behaviors.  He always had positive hepatitis C antibody due to previous infection.  No further labs required.

## 2025-05-01 NOTE — TELEPHONE ENCOUNTER
Relayed information to the patient. He voices understanding and has no further questions at this time.

## 2025-05-20 ENCOUNTER — OFFICE VISIT (OUTPATIENT)
Dept: FAMILY MEDICINE CLINIC | Facility: CLINIC | Age: 45
End: 2025-05-20
Payer: MEDICAID

## 2025-05-20 VITALS
HEART RATE: 70 BPM | WEIGHT: 282 LBS | BODY MASS INDEX: 35.06 KG/M2 | SYSTOLIC BLOOD PRESSURE: 124 MMHG | RESPIRATION RATE: 26 BRPM | HEIGHT: 75 IN | OXYGEN SATURATION: 97 % | DIASTOLIC BLOOD PRESSURE: 86 MMHG

## 2025-05-20 DIAGNOSIS — E29.1 HYPOGONADISM IN MALE: ICD-10-CM

## 2025-05-20 DIAGNOSIS — E55.9 VITAMIN D DEFICIENCY: ICD-10-CM

## 2025-05-20 DIAGNOSIS — E53.8 VITAMIN B12 DEFICIENCY: ICD-10-CM

## 2025-05-20 DIAGNOSIS — E11.43 TYPE 2 DIABETES MELLITUS WITH DIABETIC AUTONOMIC NEUROPATHY, WITHOUT LONG-TERM CURRENT USE OF INSULIN: Primary | ICD-10-CM

## 2025-05-20 DIAGNOSIS — E78.5 DYSLIPIDEMIA: ICD-10-CM

## 2025-05-20 LAB
EXPIRATION DATE: ABNORMAL
EXPIRATION DATE: NORMAL
HBA1C MFR BLD: 7.3 % (ref 4.5–5.7)
Lab: ABNORMAL
Lab: NORMAL
POC ALBUMIN, URINE: 10 MG/L
POC CREATININE, URINE: 100 MG/DL
POC URINE ALB/CREA RATIO: <30

## 2025-05-20 PROCEDURE — 1160F RVW MEDS BY RX/DR IN RCRD: CPT | Performed by: NURSE PRACTITIONER

## 2025-05-20 PROCEDURE — 82570 ASSAY OF URINE CREATININE: CPT | Performed by: NURSE PRACTITIONER

## 2025-05-20 PROCEDURE — 82044 UR ALBUMIN SEMIQUANTITATIVE: CPT | Performed by: NURSE PRACTITIONER

## 2025-05-20 PROCEDURE — 83036 HEMOGLOBIN GLYCOSYLATED A1C: CPT | Performed by: NURSE PRACTITIONER

## 2025-05-20 PROCEDURE — 3051F HG A1C>EQUAL 7.0%<8.0%: CPT | Performed by: NURSE PRACTITIONER

## 2025-05-20 PROCEDURE — 99214 OFFICE O/P EST MOD 30 MIN: CPT | Performed by: NURSE PRACTITIONER

## 2025-05-20 PROCEDURE — 1159F MED LIST DOCD IN RCRD: CPT | Performed by: NURSE PRACTITIONER

## 2025-05-20 RX ORDER — TESTOSTERONE CYPIONATE 200 MG/ML
200 INJECTION, SOLUTION INTRAMUSCULAR
Qty: 2 ML | Refills: 0 | Status: SHIPPED | OUTPATIENT
Start: 2025-05-20 | End: 2025-06-13

## 2025-05-20 NOTE — PROGRESS NOTES
"                      Established Patient        Chief Complaint:   Chief Complaint   Patient presents with    Blood Sugar Problem     Pt is here for a follow up, med refills and some labs          History of Present Illness  The patient presents for a follow-up visit.    He reports that his testosterone medication was not refilled during his last visit and seeks a refill today. He has consumed only water prior to this visit.    He continues Ozempic 2 mg and glipizide 2.5 mg for diabetes management. His hemoglobin A1c has improved. He reports no headaches, shortness of breath, blurred vision, or chest pain.    He also maintains his intake of B12 and vitamin D supplements.      Subjective     The following portions of the patient's history were reviewed and updated as appropriate: allergies, current medications, past family history, past medical history, past social history, past surgical history and problem list.    ALLERGIES  No Known Allergies    Review of Systems  Gen- No fevers, chills  HEENT--No runny nose, congestion, sore throat, ear pain  CV- No chest pain, palpitations  Resp- No cough, dyspnea  GI- No N/V/D, abd pain  -No dysuria, flank pain, difficulty urinating  Musc-No tenderness, swelling, decreased ROM  Neuro-No dizziness, headaches  Psych-No SI/HI, sleep disturbance    Objective     Vital Signs:   /86   Pulse 70   Resp 26   Ht 190.5 cm (75\")   Wt 128 kg (282 lb)   SpO2 97%   BMI 35.25 kg/m²                Physical Exam   Physical Exam  Vitals and nursing note reviewed.   HENT:      Mouth/Throat:      Lips: Pink.   Eyes:      General: Lids are normal.   Cardiovascular:      Rate and Rhythm: Normal rate and regular rhythm.      Heart sounds: Normal heart sounds.   Pulmonary:      Effort: Pulmonary effort is normal.      Breath sounds: Normal breath sounds.   Neurological:      Mental Status: He is alert and oriented to person, place, and time.      Gait: Gait is intact.   Psychiatric:    "      Attention and Perception: Attention normal.         Mood and Affect: Mood and affect normal.         Speech: Speech normal.         Behavior: Behavior normal. Behavior is cooperative.         Assessment and Plan      Assessment/Plan:   Diagnoses and all orders for this visit:    1. Type 2 diabetes mellitus with diabetic autonomic neuropathy, without long-term current use of insulin (Primary)  -     POC Glycosylated Hemoglobin (Hb A1C)  -     POC Albumin/Creatinine Ratio Urine    2. Hypogonadism in male  -     Testosterone (Free & Total), LC / MS    3. Vitamin B12 deficiency  -     Vitamin B12    4. Vitamin D deficiency  -     Vitamin D,25-Hydroxy    5. Dyslipidemia  -     Lipid Panel    Risks, benefits, and potential side effects of current/new medications reviewed with patient.  Patient voiced understanding and wished to proceed with treatment.    I will contact patient regarding test results and provide instructions regarding any necessary changes in plan of care.    Discussed need for reduction in sodium/salt/caffeine intake; improve sleep habits as able; inc formal CV exercise program with goal of vigorous activity most, if not all, days of the week.     Ambulatory blood pressure monitoring encouraged prior to taking medication daily and as needed.    Contact office for symptomatic HTN or consistently uncontrolled HTN.     Patient to increase dietary intake of vegetables, fruits, nuts, whole grains and fish. Decrease dietary intake of carbs, processed foods such as lunch meats, saturated fats, sugary beverages.     Increase exercise regimen as tolerated toward a goal of 120-150 minutes/week.     Patient to present to ED for chest pain, confusion, vision disturbance.     Patient was encouraged to keep me informed of any acute changes, lack of improvement, or any new concerning symptoms.      Assessment & Plan  Diabetes Mellitus:  - Hemoglobin A1c improved to 7.3  - Continue Ozempic 2 mg and glipizide 2.5  mg  - No headaches, shortness of breath, blurred vision, or chest pain  - Blood work to monitor glucose levels    Testosterone Replacement Therapy:  - Testosterone not refilled last time  - Blood test to assess testosterone levels  - Follow-up in 3 months  - PDMP checked and as expected    Vitamin B12 and Vitamin D Levels:  - Taking B12 and vitamin D supplements  - Blood tests to check levels  - Ensuring current dosage is appropriate  - No headaches, shortness of breath, blurred vision, or chest pain    Medication Refills:  - Confirmed recent refills of all medications  - Ensuring patient has enough medication until next visit  - Continue using [facility] for Breo refills    Discussion Summary:  Discussed plan of care in detail with pt today; pt verb understanding and agrees.            I have reviewed and updated all copied forward information, as appropriate.  I attest to the accuracy and relevance of any unchanged information.      Follow up:  Return in about 3 months (around 8/20/2025) for Next scheduled follow up.     Patient Education:  There are no Patient Instructions on file for this visit.    Patient or patient representative verbalized consent for the use of Ambient Listening during the visit with  KAYLIE Muñoz for chart documentation. 5/20/2025  10:51 EDT    KAYLIE Muñoz  05/20/25  10:50 EDT          Please note that portions of this note may have been completed with a voice recognition program.

## 2025-05-23 LAB
25(OH)D3+25(OH)D2 SERPL-MCNC: 57.8 NG/ML (ref 30–100)
CHOLEST SERPL-MCNC: 137 MG/DL (ref 0–200)
HDLC SERPL-MCNC: 32 MG/DL (ref 40–60)
LDLC SERPL CALC-MCNC: 83 MG/DL (ref 0–100)
TESTOST FREE SERPL-MCNC: 4.8 PG/ML (ref 6.8–21.5)
TESTOST SERPL-MCNC: 291 NG/DL (ref 264–916)
TRIGL SERPL-MCNC: 124 MG/DL (ref 0–150)
VIT B12 SERPL-MCNC: 1844 PG/ML (ref 211–946)
VLDLC SERPL CALC-MCNC: 22 MG/DL (ref 5–40)

## 2025-05-28 ENCOUNTER — PRIOR AUTHORIZATION (OUTPATIENT)
Dept: FAMILY MEDICINE CLINIC | Facility: CLINIC | Age: 45
End: 2025-05-28
Payer: MEDICAID

## 2025-05-28 DIAGNOSIS — E78.5 DYSLIPIDEMIA: ICD-10-CM

## 2025-05-28 RX ORDER — FENOFIBRATE 54 MG/1
54 TABLET ORAL DAILY
Qty: 90 TABLET | Refills: 1 | Status: SHIPPED | OUTPATIENT
Start: 2025-05-28

## 2025-05-28 NOTE — TELEPHONE ENCOUNTER
Prior Authorization has been started on Cover My Meds for Fenofibrate    Waiting on response from insurance       Key:[id number]

## 2025-06-01 DIAGNOSIS — E11.43 TYPE 2 DIABETES MELLITUS WITH DIABETIC AUTONOMIC NEUROPATHY, WITHOUT LONG-TERM CURRENT USE OF INSULIN: ICD-10-CM

## 2025-06-02 RX ORDER — SEMAGLUTIDE 2.68 MG/ML
2 INJECTION, SOLUTION SUBCUTANEOUS WEEKLY
Qty: 3 ML | Refills: 2 | Status: SHIPPED | OUTPATIENT
Start: 2025-06-02

## 2025-06-13 ENCOUNTER — OFFICE VISIT (OUTPATIENT)
Dept: UROLOGY | Facility: CLINIC | Age: 45
End: 2025-06-13
Payer: MEDICAID

## 2025-06-13 ENCOUNTER — LAB (OUTPATIENT)
Dept: LAB | Facility: HOSPITAL | Age: 45
End: 2025-06-13
Payer: MEDICAID

## 2025-06-13 VITALS
DIASTOLIC BLOOD PRESSURE: 86 MMHG | HEIGHT: 75 IN | SYSTOLIC BLOOD PRESSURE: 128 MMHG | BODY MASS INDEX: 35.31 KG/M2 | TEMPERATURE: 98.1 F | OXYGEN SATURATION: 98 % | HEART RATE: 77 BPM | WEIGHT: 284 LBS

## 2025-06-13 DIAGNOSIS — Z72.820 POOR SLEEP: ICD-10-CM

## 2025-06-13 DIAGNOSIS — R53.83 OTHER FATIGUE: ICD-10-CM

## 2025-06-13 DIAGNOSIS — E29.1 HYPOGONADISM IN MALE: Primary | ICD-10-CM

## 2025-06-13 DIAGNOSIS — E29.1 HYPOGONADISM IN MALE: ICD-10-CM

## 2025-06-13 LAB
HCT VFR BLD AUTO: 46.1 % (ref 37.5–51)
HGB BLD-MCNC: 15.6 G/DL (ref 13–17.7)
TESTOST SERPL-MCNC: 222 NG/DL (ref 249–836)

## 2025-06-13 PROCEDURE — 85014 HEMATOCRIT: CPT

## 2025-06-13 PROCEDURE — 85018 HEMOGLOBIN: CPT

## 2025-06-13 PROCEDURE — 84403 ASSAY OF TOTAL TESTOSTERONE: CPT

## 2025-06-13 PROCEDURE — 36415 COLL VENOUS BLD VENIPUNCTURE: CPT

## 2025-06-13 RX ORDER — TESTOSTERONE CYPIONATE 200 MG/ML
100 VIAL (ML) INTRAMUSCULAR WEEKLY
Qty: 2 ML | Refills: 0 | Status: SHIPPED | OUTPATIENT
Start: 2025-06-13

## 2025-06-13 NOTE — PROGRESS NOTES
Office Visit     Patient Name: Roque Proctor  : 1980   MRN: [id number]   Patient or patient representative verbalized consent for the use of Ambient Listening during the visit with  KAYLIE De La Vega for chart documentation. 2025  10:15 EDT    Chief Complaint   Patient presents with    [facility]     TRT, been on for about a year     Referring Provider: Lizzy Reyes APRN    Primary Care Provider: Lizzy Reyes APRN     History of Present Illness  The patient presents for evaluation of testosterone replacement therapy.    Testosterone Replacement Therapy  - The patient has been on testosterone cypionate, 200 mg vial every 2 weeks, for 1 year.  - His last injection was on 2025.  - He missed 1.5 months of injections due to blood work requirements.  - He is uncertain if his primary care physician will continue prescribing it, as she referred him to our clinic.  - He transitioned to her care 6 months ago; his previous physician initiated the therapy.  - He is unsure if his testosterone levels were ever checked, as the decision was based on symptoms.    Preferences and Additional Information  - The patient reports no snoring.  - He prefers oral medication over injections but is concerned about additing  to a daily pill burden of 6-7 tablets    MEDICATIONS  Current: Testosterone cypionate, Ozempic    Subjective   Review of System: As noted in HPI.    Past Medical History:   Diagnosis Date    Anxiety     Arthritis     Diabetes mellitus     Hepatitis C     Hypertension     Substance abuse     Visual impairment      Past Surgical History:   Procedure Laterality Date    COLONOSCOPY N/A 2025    Procedure: Colonoscopy with polypectomy;  Surgeon: Silvia Olmstead MD;  Location: [facility];  Service: Gastroenterology;  Laterality: N/A;    CYST REMOVAL Right     head     Family History   Problem Relation Age of Onset    Lung cancer Mother     Diabetes Father     Colon  cancer Father     Diabetes Maternal Grandmother     Diabetes Maternal Grandfather     Diabetes Paternal Grandmother     Diabetes Paternal Grandfather      Social History     Socioeconomic History    Marital status: Single   Tobacco Use    Smoking status: Former     Current packs/day: 0.00     Average packs/day: 1 pack/day for 27.0 years (27.0 ttl pk-yrs)     Types: Cigarettes     Start date:      Quit date:      Years since quittin.4     Passive exposure: Never    Smokeless tobacco: Never   Vaping Use    Vaping status: Every Day    Substances: Nicotine, Flavoring    Devices: Disposable   Substance and Sexual Activity    Alcohol use: Not Currently    Drug use: Not Currently     Types: Heroin, Methamphetamines     Comment: Quit using both in     Sexual activity: Yes     Partners: Female     Birth control/protection: None       Current Outpatient Medications:     amLODIPine (NORVASC) 10 MG tablet, Take 1 tablet by mouth Daily., Disp: 90 tablet, Rfl: 1    atorvastatin (LIPITOR) 10 MG tablet, Take 1 tablet by mouth Every Night., Disp: 90 tablet, Rfl: 1    fenofibrate (TRICOR) 54 MG tablet, Take 1 tablet by mouth Daily., Disp: 90 tablet, Rfl: 1    glipizide (GLUCOTROL XL) 2.5 MG 24 hr tablet, Take 1 tablet by mouth Daily., Disp: 90 tablet, Rfl: 1    losartan (Cozaar) 25 MG tablet, Take 1 tablet by mouth Daily., Disp: 90 tablet, Rfl: 1    multivitamin with minerals tablet tablet, Take 1 tablet by mouth Daily., Disp: , Rfl:     Ozempic, 2 MG/DOSE, 8 MG/3ML solution pen-injector, Inject 2 mg under the skin into the appropriate area as directed 1 (One) Time Per Week., Disp: 3 mL, Rfl: 2    vitamin B-12 (CYANOCOBALAMIN) 1000 MCG tablet, Take 1 tablet by mouth Daily., Disp: , Rfl:     vitamin D3 125 MCG (5000 UT) capsule capsule, Take 1 capsule by mouth Daily., Disp: , Rfl:     Testosterone Cypionate 200 MG/ML solution, Inject 0.5 mL as directed 1 (One) Time Per Week., Disp: 2 mL, Rfl: 0    No Known  "Allergies  Objective   Visit Vitals  /86   Pulse 77   Temp 98.1 °F (36.7 °C)   Ht 190.5 cm (75\")   Wt 129 kg (284 lb)   SpO2 98%   BMI 35.50 kg/m²      Body mass index is 35.5 kg/m².   Physical Exam  Vitals and nursing note reviewed.   Constitutional:       General: He is not in acute distress.     Appearance: Normal appearance. He is obese. He is not ill-appearing.   HENT:      Head: Normocephalic and atraumatic.   Pulmonary:      Effort: Pulmonary effort is normal.   Skin:     General: Skin is warm and dry.   Neurological:      General: No focal deficit present.      Mental Status: He is alert and oriented to person, place, and time.   Psychiatric:         Mood and Affect: Mood normal.         Behavior: Behavior normal.      Labs  No results found for: \"COLORU\", \"CLARITYU\", \"SPECGRAV\", \"PHUR\", \"LEUKOCYTESUR\", \"NITRITE\", \"PROTEINPOCUA\", \"GLUCOSEUR\", \"KETONESU\", \"UROBILINOGEN\", \"BILIRUBINUR\", \"RBCUR\"   No results found for: \"WBCUA\", \"RBCUA\", \"BACTERIA\", \"HYALCASTU\", \"SQUAMEPIUA\"     Lab Results   Component Value Date    WBC 15.1 (H) 10/23/2024    HGB 15.3 10/23/2024    HCT 47.1 10/23/2024    MCV 86 10/23/2024     10/23/2024     Lab Results   Component Value Date    GLUCOSE 148 (H) 04/25/2025    CALCIUM 9.3 04/25/2025     04/25/2025    K 4.1 04/25/2025    CO2 24.4 04/25/2025     04/25/2025    BUN 11 04/25/2025    BUN 10 12/20/2024    CREATININE 1.02 04/25/2025    CREATININE 0.93 12/20/2024    EGFR 91.2 04/25/2025    EGFR 102.6 12/20/2024    BCR 10.8 04/25/2025    ANIONGAP 11.6 04/25/2025    ALT 24 04/25/2025    AST 23 04/25/2025     Lab Results   Component Value Date    HGBA1C 7.3 (A) 05/20/2025     Lab Results   Component Value Date    PSA 1.3 10/23/2024     No results found for: \"URICACIDSTN\", \"JBCQ8KZCKUH\", \"EOEH4VERXK\", \"LABMAGN\"  Lab Results   Component Value Date    AZEL82TQ 57.8 05/20/2025     No results found for: \"CYSTINE\", \"URINEVOLUM\", \"CALCIUMUR\", \"OXALATEU\", \"CITRATEUR\", \"LABPH\", " "\"URICUR\", \"NAUR\", \"KUR\", \"MAGNESIUMUR\", \"PHOSUR\", \"AMMONIUMUR\", \"CLUR\", \"[id number]\", \"UREAUR\", \"LABCREAUR\"  Lab Results   Component Value Date    TESTFRE 4.8 (L) 05/20/2025    PSA 1.3 10/23/2024       Radiographic Studies  No Images in the past 120 days found..    I have reviewed the above labs and imaging.     Assessment / Plan    Diagnoses and all orders for this visit:    1. Hypogonadism in male (Primary)  -     Hemoglobin & Hematocrit, Blood; Future  -     Testosterone; Future  -     Testosterone Cypionate 200 MG/ML solution; Inject 0.5 mL as directed 1 (One) Time Per Week.  Dispense: 2 mL; Refill: 0  -     Hemoglobin & Hematocrit, Blood; Future  -     Testosterone; Future    2. Other fatigue  -     Hemoglobin & Hematocrit, Blood; Future  -     Testosterone; Future    3. Poor sleep  -     Hemoglobin & Hematocrit, Blood; Future  -     Testosterone; Future       Assessment & Plan  1. Testosterone replacement therapy  - Testosterone level was 291 on 05/20/2025  - Discussed potential side effects, including polycythemia, and the impact of smoking and sleep apnea  - Emphasized regular lab work and monitoring  - Goal: maintain testosterone levels within 300-900, target 500-700  - PSA level from 2024 was satisfactory  - Explained refill process via MyChart and consequences of missing appointments or lab work  - Advised self-injection in vastus lateralis  - Continue current medication supply, Adjust dosage to 100 mg weekly   - Complete lab work on Tuesday morning before 10:00 AM, mid-week of dosing schedule  - Ordered repeat testosterone level and CBC to be done today.    - Patient reviewed and signed zero tolerance policy, [facility] testosterone therapy policy, and FAQs of testosterone replacement therapy.  All questions were answered and patient agreed to follow policy.  Policies signed by patient and witnessed by me.  Patient was given signed copies for reference.       2. Fatigue and poor sleep  - We " discussed at length that this may be related to other etiologies.  - Discussed that when he returns if his symptoms are not improved by TRT then he will be advised to discontinue at that time.       Return in about 1 month (around 7/13/2025) for f/u with Stefanie with labs completed as directed. .    Stefanie Sarmiento, MSN, APRN, FNP-C  [facility]

## 2025-06-27 DIAGNOSIS — E11.43 TYPE 2 DIABETES MELLITUS WITH DIABETIC AUTONOMIC NEUROPATHY, WITHOUT LONG-TERM CURRENT USE OF INSULIN: ICD-10-CM

## 2025-06-27 RX ORDER — SEMAGLUTIDE 2.68 MG/ML
2 INJECTION, SOLUTION SUBCUTANEOUS WEEKLY
Qty: 3 ML | Refills: 2 | Status: SHIPPED | OUTPATIENT
Start: 2025-06-27

## 2025-07-08 ENCOUNTER — LAB (OUTPATIENT)
Dept: LAB | Facility: HOSPITAL | Age: 45
End: 2025-07-08
Payer: MEDICAID

## 2025-07-08 PROCEDURE — 85014 HEMATOCRIT: CPT | Performed by: NURSE PRACTITIONER

## 2025-07-08 PROCEDURE — 85018 HEMOGLOBIN: CPT | Performed by: NURSE PRACTITIONER

## 2025-07-08 PROCEDURE — 84403 ASSAY OF TOTAL TESTOSTERONE: CPT | Performed by: NURSE PRACTITIONER

## 2025-07-18 ENCOUNTER — OFFICE VISIT (OUTPATIENT)
Dept: UROLOGY | Facility: CLINIC | Age: 45
End: 2025-07-18
Payer: MEDICAID

## 2025-07-18 VITALS
DIASTOLIC BLOOD PRESSURE: 82 MMHG | WEIGHT: 284 LBS | TEMPERATURE: 100.8 F | HEIGHT: 75 IN | SYSTOLIC BLOOD PRESSURE: 128 MMHG | OXYGEN SATURATION: 96 % | BODY MASS INDEX: 35.31 KG/M2 | RESPIRATION RATE: 14 BRPM | HEART RATE: 79 BPM

## 2025-07-18 DIAGNOSIS — E29.1 HYPOGONADISM IN MALE: Primary | ICD-10-CM

## 2025-07-18 DIAGNOSIS — R50.9 FEVER, UNSPECIFIED FEVER CAUSE: ICD-10-CM

## 2025-07-18 RX ORDER — TESTOSTERONE CYPIONATE 200 MG/ML
100 VIAL (ML) INTRAMUSCULAR WEEKLY
Qty: 2 ML | Refills: 0 | Status: SHIPPED | OUTPATIENT
Start: 2025-07-18

## 2025-07-18 NOTE — PROGRESS NOTES
Office Visit     Patient Name: Roque Proctor  : 1980   MRN: [id number]   Patient or patient representative verbalized consent for the use of Ambient Listening during the visit with  KAYLIE De La Vega for chart documentation. 2025  13:16 EDT    Chief Complaint   Patient presents with    Follow-up     Referring Provider: No ref. provider found    Primary Care Provider: Lizzy Reyes APRN     History of Present Illness  The patient is a 45-year-old male with a history of hypogonadism, currently on 100 mg of testosterone cypionate weekly.    Hypogonadism  - This treatment has improved his sleep, energy levels, and libido.  - His testosterone level is 517.  - His hemoglobin and hematocrit are normal, indicating no polycythemia.  - He reports feeling better since adjusting the timing of his testosterone dosing.  - He has no new or worsening urologic complaints.    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   - Denies cold and flu symptoms, no UTI complaints.      Subjective   Review of System: As noted in HPI.    Past Medical History:   Diagnosis Date    Anxiety     Arthritis     Diabetes mellitus     Hepatitis C     Hypertension     Substance abuse     Visual impairment      Past Surgical History:   Procedure Laterality Date    COLONOSCOPY N/A 2025    Procedure: Colonoscopy with polypectomy;  Surgeon: Silvia Olmstead MD;  Location: [facility] ENDOSCOPY;  Service: Gastroenterology;  Laterality: N/A;    CYST REMOVAL Right     head     Family History   Problem Relation Age of Onset    Lung cancer Mother     Diabetes Father     Colon cancer Father     Diabetes Maternal Grandmother     Diabetes Maternal Grandfather     Diabetes Paternal Grandmother     Diabetes Paternal Grandfather      Social History     Socioeconomic History    Marital status: Single   Tobacco Use    Smoking status: Former     Current packs/day:  "0.00     Average packs/day: 1 pack/day for 27.0 years (27.0 ttl pk-yrs)     Types: Cigarettes     Start date:      Quit date:      Years since quittin.5     Passive exposure: Never    Smokeless tobacco: Never   Vaping Use    Vaping status: Every Day    Substances: Nicotine, Flavoring    Devices: Disposable   Substance and Sexual Activity    Alcohol use: Not Currently    Drug use: Not Currently     Types: Heroin, Methamphetamines     Comment: Quit using both in     Sexual activity: Yes     Partners: Female     Birth control/protection: None       Current Outpatient Medications:     amLODIPine (NORVASC) 10 MG tablet, Take 1 tablet by mouth Daily., Disp: 90 tablet, Rfl: 1    atorvastatin (LIPITOR) 10 MG tablet, Take 1 tablet by mouth Every Night., Disp: 90 tablet, Rfl: 1    fenofibrate (TRICOR) 54 MG tablet, Take 1 tablet by mouth Daily., Disp: 90 tablet, Rfl: 1    glipizide (GLUCOTROL XL) 2.5 MG 24 hr tablet, Take 1 tablet by mouth Daily., Disp: 90 tablet, Rfl: 1    losartan (Cozaar) 25 MG tablet, Take 1 tablet by mouth Daily., Disp: 90 tablet, Rfl: 1    multivitamin with minerals tablet tablet, Take 1 tablet by mouth Daily., Disp: , Rfl:     Ozempic, 2 MG/DOSE, 8 MG/3ML solution pen-injector, Inject 2 mg under the skin into the appropriate area as directed 1 (One) Time Per Week., Disp: 3 mL, Rfl: 2    Testosterone Cypionate 200 MG/ML solution, Inject 0.5 mL as directed 1 (One) Time Per Week., Disp: 2 mL, Rfl: 0    vitamin B-12 (CYANOCOBALAMIN) 1000 MCG tablet, Take 1 tablet by mouth Daily., Disp: , Rfl:     vitamin D3 125 MCG (5000 UT) capsule capsule, Take 1 capsule by mouth Daily., Disp: , Rfl:     No Known Allergies  Objective   Visit Vitals  /82 (BP Location: Left arm, Patient Position: Sitting, Cuff Size: Adult)   Pulse 79   Temp (!) 100.8 °F (38.2 °C) (Infrared)   Resp 14   Ht 190.5 cm (75\")   Wt 129 kg (284 lb)   SpO2 96%   BMI 35.50 kg/m²      Body mass index is 35.5 kg/m².   Physical " "Exam  Vitals and nursing note reviewed.   Constitutional:       General: He is not in acute distress.     Appearance: Normal appearance. He is overweight. He is not ill-appearing.   HENT:      Head: Normocephalic and atraumatic.   Pulmonary:      Effort: Pulmonary effort is normal.   Skin:     General: Skin is warm and dry.   Neurological:      General: No focal deficit present.      Mental Status: He is alert and oriented to person, place, and time.   Psychiatric:         Mood and Affect: Mood normal.         Behavior: Behavior normal.      Labs    Lab Results   Component Value Date    WBC 15.1 (H) 10/23/2024    HGB 15.4 07/08/2025    HCT 46.5 07/08/2025    MCV 86 10/23/2024     10/23/2024     Lab Results   Component Value Date    GLUCOSE 148 (H) 04/25/2025    CALCIUM 9.3 04/25/2025     04/25/2025    K 4.1 04/25/2025    CO2 24.4 04/25/2025     04/25/2025    BUN 11 04/25/2025    BUN 10 12/20/2024    CREATININE 1.02 04/25/2025    CREATININE 0.93 12/20/2024    EGFR 91.2 04/25/2025    EGFR 102.6 12/20/2024    BCR 10.8 04/25/2025    ANIONGAP 11.6 04/25/2025    ALT 24 04/25/2025    AST 23 04/25/2025     Lab Results   Component Value Date    HGBA1C 7.3 (A) 05/20/2025     Lab Results   Component Value Date    PSA 1.3 10/23/2024     No results found for: \"URICACIDSTN\", \"RRIR1WACAYE\", \"ZTAX0PIHUS\", \"LABMAGN\"  Lab Results   Component Value Date    WATO70SQ 57.8 05/20/2025     No results found for: \"CYSTINE\", \"URINEVOLUM\", \"CALCIUMUR\", \"OXALATEU\", \"CITRATEUR\", \"LABPH\", \"URICUR\", \"NAUR\", \"KUR\", \"MAGNESIUMUR\", \"PHOSUR\", \"AMMONIUMUR\", \"CLUR\", \"[id number]\", \"UREAUR\", \"LABCREAUR\"  Lab Results   Component Value Date    TESTFRE 4.8 (L) 05/20/2025    PSA 1.3 10/23/2024     Radiographic Studies  No Images in the past 120 days found.    I have reviewed the above labs and imaging.     Assessment / Plan    Diagnoses and all orders for this visit:    1. Hypogonadism in male (Primary)  -     Testosterone Cypionate 200 " MG/ML solution; Inject 0.5 mL as directed 1 (One) Time Per Week.  Dispense: 2 mL; Refill: 0  -     Hemoglobin & Hematocrit, Blood; Future  -     Testosterone; Future  -     PSA Diagnostic; Future    2. Fever, unspecified fever cause       Assessment & Plan  1. Hypogonadism  - On 100 mg of testosterone cypionate weekly, improving sleep, energy levels, and libido  - Testosterone level is 517, hemoglobin and hematocrit are normal  - Feels better since changing dosing timing  - Prescription for testosterone cypionate 100 mg weekly provided  - Follow up in 6 months with labs done 1 week prior on Monday morning before 10:00 AM (normally injects on Thursday)    2. Fever  - Reports fever likely due to sunburn and inadequate fluid intake  - Advised to increase fluid intake and stay in air-conditioned environments  - Can take Tylenol and ibuprofen as needed  - Follow up with primary care provider if new or worsening symptoms occur     Return in about 6 months (around 1/18/2026) for f/u with Stefanie with labs completed 1 week prior .    Stefanie Sarmiento, MSN, APRN, FNP-C  [facility] Juwan FORBES spent a total of 33 minutes with the patient and the chart engaging in data gathering and interpretation, patient interaction, as well as counseling on the risks, benefits, and alternatives of the therapy and coordinating care.

## 2025-07-22 DIAGNOSIS — E78.5 DYSLIPIDEMIA: ICD-10-CM

## 2025-07-22 DIAGNOSIS — I10 PRIMARY HYPERTENSION: ICD-10-CM

## 2025-07-22 DIAGNOSIS — E11.43 TYPE 2 DIABETES MELLITUS WITH DIABETIC AUTONOMIC NEUROPATHY, WITHOUT LONG-TERM CURRENT USE OF INSULIN: ICD-10-CM

## 2025-07-23 RX ORDER — LOSARTAN POTASSIUM 25 MG/1
25 TABLET ORAL DAILY
Qty: 90 TABLET | Refills: 1 | Status: SHIPPED | OUTPATIENT
Start: 2025-07-23

## 2025-07-23 RX ORDER — ATORVASTATIN CALCIUM 10 MG/1
10 TABLET, FILM COATED ORAL NIGHTLY
Qty: 90 TABLET | Refills: 1 | Status: SHIPPED | OUTPATIENT
Start: 2025-07-23

## 2025-07-23 RX ORDER — GLIPIZIDE 2.5 MG/1
2.5 TABLET, EXTENDED RELEASE ORAL DAILY
Qty: 90 TABLET | Refills: 1 | Status: SHIPPED | OUTPATIENT
Start: 2025-07-23

## 2025-07-23 RX ORDER — AMLODIPINE BESYLATE 10 MG/1
10 TABLET ORAL DAILY
Qty: 90 TABLET | Refills: 1 | Status: SHIPPED | OUTPATIENT
Start: 2025-07-23

## 2025-07-31 DIAGNOSIS — E11.43 TYPE 2 DIABETES MELLITUS WITH DIABETIC AUTONOMIC NEUROPATHY, WITHOUT LONG-TERM CURRENT USE OF INSULIN: ICD-10-CM

## 2025-08-01 RX ORDER — SEMAGLUTIDE 2.68 MG/ML
2 INJECTION, SOLUTION SUBCUTANEOUS WEEKLY
Qty: 3 ML | Refills: 2 | Status: SHIPPED | OUTPATIENT
Start: 2025-08-01

## 2025-08-20 DIAGNOSIS — E29.1 HYPOGONADISM IN MALE: ICD-10-CM

## 2025-08-20 RX ORDER — TESTOSTERONE CYPIONATE 200 MG/ML
100 VIAL (ML) INTRAMUSCULAR WEEKLY
Qty: 2 ML | Refills: 0 | Status: SHIPPED | OUTPATIENT
Start: 2025-08-20

## 2025-08-21 ENCOUNTER — OFFICE VISIT (OUTPATIENT)
Dept: FAMILY MEDICINE CLINIC | Facility: CLINIC | Age: 45
End: 2025-08-21
Payer: MEDICAID

## 2025-08-21 VITALS
HEART RATE: 81 BPM | HEIGHT: 75 IN | BODY MASS INDEX: 35.31 KG/M2 | WEIGHT: 284 LBS | DIASTOLIC BLOOD PRESSURE: 92 MMHG | SYSTOLIC BLOOD PRESSURE: 130 MMHG | OXYGEN SATURATION: 97 % | RESPIRATION RATE: 16 BRPM

## 2025-08-21 DIAGNOSIS — R79.89 LOW TESTOSTERONE IN MALE: ICD-10-CM

## 2025-08-21 DIAGNOSIS — E11.43 TYPE 2 DIABETES MELLITUS WITH DIABETIC AUTONOMIC NEUROPATHY, WITHOUT LONG-TERM CURRENT USE OF INSULIN: Primary | ICD-10-CM

## 2025-08-21 LAB
EXPIRATION DATE: ABNORMAL
HBA1C MFR BLD: 7 % (ref 4.5–5.7)
Lab: ABNORMAL

## 2025-08-26 ENCOUNTER — PRIOR AUTHORIZATION (OUTPATIENT)
Dept: FAMILY MEDICINE CLINIC | Facility: CLINIC | Age: 45
End: 2025-08-26
Payer: MEDICAID

## (undated) DEVICE — SINGLE-USE POLYPECTOMY SNARE: Brand: CAPTIVATOR II

## (undated) DEVICE — HYBRID CO2 TUBING/CAP SET FOR OLYMPUS® SCOPES & CO2 SOURCE: Brand: ERBE

## (undated) DEVICE — VLV SXN AIR/H2O ORCAPOD3 1P/U STRL

## (undated) DEVICE — QUICK CATCH IN-LINE SUCTION POLYP TRAP IS USED FOR SUCTION RETRIEVAL OF ENDOSCOPICALLY REMOVED POLYPS.

## (undated) DEVICE — ENDOSCOPY PORT CONNECTOR FOR OLYMPUS® SCOPES: Brand: ERBE

## (undated) DEVICE — Device

## (undated) DEVICE — LUBE JELLY PK/2.75GM STRL BX/144